# Patient Record
Sex: MALE | Race: BLACK OR AFRICAN AMERICAN | NOT HISPANIC OR LATINO | Employment: FULL TIME | URBAN - METROPOLITAN AREA
[De-identification: names, ages, dates, MRNs, and addresses within clinical notes are randomized per-mention and may not be internally consistent; named-entity substitution may affect disease eponyms.]

---

## 2017-12-11 ENCOUNTER — HOSPITAL ENCOUNTER (INPATIENT)
Facility: HOSPITAL | Age: 37
LOS: 5 days | Discharge: HOME | DRG: 977 | End: 2017-12-16
Attending: EMERGENCY MEDICINE | Admitting: INTERNAL MEDICINE
Payer: COMMERCIAL

## 2017-12-11 DIAGNOSIS — B20 HIV (HUMAN IMMUNODEFICIENCY VIRUS INFECTION): ICD-10-CM

## 2017-12-11 DIAGNOSIS — B20 AIDS (ACQUIRED IMMUNODEFICIENCY SYNDROME), CD4 <=200: ICD-10-CM

## 2017-12-11 DIAGNOSIS — R55 SYNCOPE, UNSPECIFIED SYNCOPE TYPE: Primary | ICD-10-CM

## 2017-12-11 DIAGNOSIS — A52.3 NEUROSYPHILIS: ICD-10-CM

## 2017-12-11 DIAGNOSIS — L98.9 SKIN LESIONS: ICD-10-CM

## 2017-12-11 DIAGNOSIS — R55 SYNCOPE: ICD-10-CM

## 2017-12-11 LAB
ALBUMIN SERPL BCP-MCNC: 2.6 G/DL
ALP SERPL-CCNC: 81 U/L
ALT SERPL W/O P-5'-P-CCNC: 16 U/L
ANION GAP SERPL CALC-SCNC: 12 MMOL/L
AST SERPL-CCNC: 36 U/L
BACTERIA #/AREA URNS AUTO: ABNORMAL /HPF
BASOPHILS # BLD AUTO: 0.01 K/UL
BASOPHILS NFR BLD: 0.2 %
BILIRUB SERPL-MCNC: 0.4 MG/DL
BILIRUB UR QL STRIP: NEGATIVE
BUN SERPL-MCNC: 12 MG/DL
C TRACH DNA SPEC QL NAA+PROBE: NOT DETECTED
CALCIUM SERPL-MCNC: 9.6 MG/DL
CAOX CRY UR QL COMP ASSIST: ABNORMAL
CD3+CD4+ CELLS # BLD: 164 CELLS/UL (ref 300–1400)
CD3+CD4+ CELLS NFR BLD: 10.6 % (ref 28–57)
CHLORIDE SERPL-SCNC: 101 MMOL/L
CLARITY CSF: CLEAR
CLARITY UR REFRACT.AUTO: ABNORMAL
CO2 SERPL-SCNC: 23 MMOL/L
COLOR CSF: COLORLESS
COLOR UR AUTO: ABNORMAL
CREAT SERPL-MCNC: 1.1 MG/DL
DIFFERENTIAL METHOD: ABNORMAL
EOSINOPHIL # BLD AUTO: 0 K/UL
EOSINOPHIL NFR BLD: 0 %
ERYTHROCYTE [DISTWIDTH] IN BLOOD BY AUTOMATED COUNT: 14.5 %
EST. GFR  (AFRICAN AMERICAN): >60 ML/MIN/1.73 M^2
EST. GFR  (NON AFRICAN AMERICAN): >60 ML/MIN/1.73 M^2
GLUCOSE CSF-MCNC: 57 MG/DL
GLUCOSE SERPL-MCNC: 124 MG/DL
GLUCOSE UR QL STRIP: NEGATIVE
GRAN CASTS UR QL COMP ASSIST: 1 /LPF
HCT VFR BLD AUTO: 33.7 %
HGB BLD-MCNC: 10.4 G/DL
HGB UR QL STRIP: ABNORMAL
HYALINE CASTS UR QL AUTO: 7 /LPF
IMM GRANULOCYTES # BLD AUTO: 0.04 K/UL
IMM GRANULOCYTES NFR BLD AUTO: 0.9 %
KETONES UR QL STRIP: NEGATIVE
LDH SERPL L TO P-CCNC: 223 U/L
LEUKOCYTE ESTERASE UR QL STRIP: NEGATIVE
LYMPHOCYTES # BLD AUTO: 1.6 K/UL
LYMPHOCYTES NFR BLD: 37 %
LYMPHOCYTES NFR CSF MANUAL: 75 %
MCH RBC QN AUTO: 28.8 PG
MCHC RBC AUTO-ENTMCNC: 30.9 G/DL
MCV RBC AUTO: 93 FL
MICROSCOPIC COMMENT: ABNORMAL
MONOCYTES # BLD AUTO: 0.5 K/UL
MONOCYTES NFR BLD: 12.5 %
MONOS+MACROS NFR CSF MANUAL: 24 %
N GONORRHOEA DNA SPEC QL NAA+PROBE: NOT DETECTED
NEUTROPHILS # BLD AUTO: 2.1 K/UL
NEUTROPHILS NFR BLD: 49.4 %
NEUTROPHILS NFR CSF MANUAL: 1 %
NITRITE UR QL STRIP: NEGATIVE
NRBC BLD-RTO: 0 /100 WBC
PH UR STRIP: 5 [PH] (ref 5–8)
PLATELET # BLD AUTO: 441 K/UL
PMV BLD AUTO: 8.7 FL
POTASSIUM SERPL-SCNC: 4 MMOL/L
PROT CSF-MCNC: 35 MG/DL
PROT SERPL-MCNC: 10.6 G/DL
PROT UR QL STRIP: ABNORMAL
RBC # BLD AUTO: 3.61 M/UL
RBC # CSF: 0 /CU MM
RBC #/AREA URNS AUTO: 1 /HPF (ref 0–4)
SODIUM SERPL-SCNC: 136 MMOL/L
SP GR UR STRIP: >=1.03 (ref 1–1.03)
SPECIMEN VOL CSF: 1.8 ML
SQUAMOUS #/AREA URNS AUTO: 0 /HPF
URN SPEC COLLECT METH UR: ABNORMAL
UROBILINOGEN UR STRIP-ACNC: NEGATIVE EU/DL
WBC # BLD AUTO: 4.24 K/UL
WBC # CSF: 3 /CU MM
WBC #/AREA URNS AUTO: 5 /HPF (ref 0–5)

## 2017-12-11 PROCEDURE — 86403 PARTICLE AGGLUT ANTBDY SCRN: CPT | Mod: 91

## 2017-12-11 PROCEDURE — 86592 SYPHILIS TEST NON-TREP QUAL: CPT

## 2017-12-11 PROCEDURE — 80053 COMPREHEN METABOLIC PANEL: CPT

## 2017-12-11 PROCEDURE — 36415 COLL VENOUS BLD VENIPUNCTURE: CPT

## 2017-12-11 PROCEDURE — 85025 COMPLETE CBC W/AUTO DIFF WBC: CPT

## 2017-12-11 PROCEDURE — 89051 BODY FLUID CELL COUNT: CPT

## 2017-12-11 PROCEDURE — 99285 EMERGENCY DEPT VISIT HI MDM: CPT | Mod: 25,,, | Performed by: EMERGENCY MEDICINE

## 2017-12-11 PROCEDURE — 84157 ASSAY OF PROTEIN OTHER: CPT

## 2017-12-11 PROCEDURE — 93005 ELECTROCARDIOGRAM TRACING: CPT

## 2017-12-11 PROCEDURE — 009U3ZX DRAINAGE OF SPINAL CANAL, PERCUTANEOUS APPROACH, DIAGNOSTIC: ICD-10-PCS | Performed by: EMERGENCY MEDICINE

## 2017-12-11 PROCEDURE — 86592 SYPHILIS TEST NON-TREP QUAL: CPT | Mod: 91

## 2017-12-11 PROCEDURE — 87205 SMEAR GRAM STAIN: CPT

## 2017-12-11 PROCEDURE — 86593 SYPHILIS TEST NON-TREP QUANT: CPT

## 2017-12-11 PROCEDURE — 99285 EMERGENCY DEPT VISIT HI MDM: CPT | Mod: 25

## 2017-12-11 PROCEDURE — 87529 HSV DNA AMP PROBE: CPT | Mod: 59

## 2017-12-11 PROCEDURE — 62270 DX LMBR SPI PNXR: CPT

## 2017-12-11 PROCEDURE — 62270 DX LMBR SPI PNXR: CPT | Mod: ,,, | Performed by: EMERGENCY MEDICINE

## 2017-12-11 PROCEDURE — 87591 N.GONORRHOEAE DNA AMP PROB: CPT

## 2017-12-11 PROCEDURE — 83615 LACTATE (LD) (LDH) ENZYME: CPT

## 2017-12-11 PROCEDURE — 86361 T CELL ABSOLUTE COUNT: CPT

## 2017-12-11 PROCEDURE — 86780 TREPONEMA PALLIDUM: CPT

## 2017-12-11 PROCEDURE — 81001 URINALYSIS AUTO W/SCOPE: CPT

## 2017-12-11 PROCEDURE — 86593 SYPHILIS TEST NON-TREP QUANT: CPT | Mod: 91

## 2017-12-11 PROCEDURE — 99000 SPECIMEN HANDLING OFFICE-LAB: CPT

## 2017-12-11 PROCEDURE — 11000001 HC ACUTE MED/SURG PRIVATE ROOM

## 2017-12-11 PROCEDURE — 25000003 PHARM REV CODE 250: Performed by: HOSPITALIST

## 2017-12-11 PROCEDURE — 93010 ELECTROCARDIOGRAM REPORT: CPT | Mod: ,,, | Performed by: INTERNAL MEDICINE

## 2017-12-11 PROCEDURE — 87070 CULTURE OTHR SPECIMN AEROBIC: CPT

## 2017-12-11 PROCEDURE — 87798 DETECT AGENT NOS DNA AMP: CPT

## 2017-12-11 PROCEDURE — 96360 HYDRATION IV INFUSION INIT: CPT

## 2017-12-11 PROCEDURE — 87529 HSV DNA AMP PROBE: CPT

## 2017-12-11 PROCEDURE — 87252 VIRUS INOCULATION TISSUE: CPT

## 2017-12-11 PROCEDURE — 96361 HYDRATE IV INFUSION ADD-ON: CPT

## 2017-12-11 PROCEDURE — 25000003 PHARM REV CODE 250: Performed by: PHYSICIAN ASSISTANT

## 2017-12-11 PROCEDURE — 82945 GLUCOSE OTHER FLUID: CPT

## 2017-12-11 PROCEDURE — 87102 FUNGUS ISOLATION CULTURE: CPT

## 2017-12-11 PROCEDURE — 86403 PARTICLE AGGLUT ANTBDY SCRN: CPT

## 2017-12-11 RX ORDER — LEVOFLOXACIN 500 MG/1
500 TABLET, FILM COATED ORAL
Status: ON HOLD | COMMUNITY
End: 2017-12-15 | Stop reason: HOSPADM

## 2017-12-11 RX ORDER — LIDOCAINE HYDROCHLORIDE 10 MG/ML
100 INJECTION, SOLUTION EPIDURAL; INFILTRATION; INTRACAUDAL; PERINEURAL
Status: COMPLETED | OUTPATIENT
Start: 2017-12-11 | End: 2017-12-11

## 2017-12-11 RX ORDER — PROMETHAZINE HYDROCHLORIDE 25 MG/1
25 TABLET ORAL EVERY 4 HOURS PRN
Status: DISCONTINUED | OUTPATIENT
Start: 2017-12-11 | End: 2017-12-16 | Stop reason: HOSPADM

## 2017-12-11 RX ORDER — ACETAMINOPHEN 325 MG/1
650 TABLET ORAL
Status: COMPLETED | OUTPATIENT
Start: 2017-12-11 | End: 2017-12-11

## 2017-12-11 RX ORDER — HYDROCODONE BITARTRATE AND ACETAMINOPHEN 5; 325 MG/1; MG/1
1 TABLET ORAL EVERY 4 HOURS PRN
Status: DISCONTINUED | OUTPATIENT
Start: 2017-12-11 | End: 2017-12-16 | Stop reason: HOSPADM

## 2017-12-11 RX ORDER — MOXIFLOXACIN HYDROCHLORIDE 400 MG/1
400 TABLET ORAL DAILY
Status: DISCONTINUED | OUTPATIENT
Start: 2017-12-12 | End: 2017-12-12

## 2017-12-11 RX ADMIN — ACETAMINOPHEN 650 MG: 325 TABLET ORAL at 01:12

## 2017-12-11 RX ADMIN — LIDOCAINE HYDROCHLORIDE 100 MG: 10 INJECTION, SOLUTION EPIDURAL; INFILTRATION; INTRACAUDAL; PERINEURAL at 01:12

## 2017-12-11 RX ADMIN — SODIUM CHLORIDE 1000 ML: 0.9 INJECTION, SOLUTION INTRAVENOUS at 10:12

## 2017-12-11 NOTE — ED NOTES
APPEARANCE: awake and alert in NAD.  SKIN: warm, dry and intact. 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   MUSCULOSKELETAL: Patient moving all extremities spontaneously, no obvious swelling or deformities noted. Ambulates independently.  RESPIRATORY: no shortness of breath.  CARDIAC: heart tones normal. Regular rate and rhythm; 2+ distal pulses; no peripheral edema  ABDOMEN: S/ND/NT, normoactive bowel sounds present in all four quadrants. Normal stool pattern.  : voids spontaneously without difficulty.  Neurologic: AAO x 4; follows commands equal strength in all extremities; denies numbness/tingling.

## 2017-12-11 NOTE — ED PROVIDER NOTES
Encounter Date: 12/11/2017    SCRIBE #1 NOTE: I, Charisma Whitaker, am scribing for, and in the presence of,  Dr. Camacho. I have scribed the following portions of the note - the APC attestation and the EKG reading. Other sections scribed: CT Head.       History     Chief Complaint   Patient presents with    Loss of Consciousness     syncopal episode while at work, reported feeling dizzy and clammy, then woke up with people around him.  was helped to ground per witnesses     Patient is a 37-year-old male with past medical history of HIV, syphilis who presents the ED with LOC.  Patient states around 9:40 AM today, he was standing with coworkers.  Patient states prior to losing consciousness, he became dizzy and clammy for a couple of seconds.  He denies any other prodromal symptoms.  He is unsure how long he was out for, but states that it wishes for a few seconds.  He was helped to the ground.  Patient states that he swallowed himself during this incident.  He had some confusion after LOC, but is back at baseline. He was not told that he had any seizure-like activity. He did not eat or drink anything this morning like he normally does. He states that he noticed a lesion to his chin approximately 2 weeks ago, and other lesions to his neck, arms, and trunk one week ago.  Patient applied a Ace wrap to his left knee for pain relief, but denies any recent injury or trauma or dec ROM. He denies any headache, dizziness, sore throat, chest pain, cough, SOB, abdominal pain, diarrhea, constipation, penile pain, penile discharge, testicular swelling, dysuria, urinary frequency.  Patient does not take HIV medication (last use in August 2017) and does not follow-up with infectious disease any more due to costly medication. He does not know his last CD4 count.    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 His HIV status is not disclosed with her coworkers.               Review of patient's allergies indicates:  No Known  Allergies  Past Medical History:   Diagnosis Date    Anemia of other chronic disease 10/26/2016    HIV (human immunodeficiency virus infection)     Syphilis      Past Surgical History:   Procedure Laterality Date    ANKLE FRACTURE SURGERY      WISDOM TOOTH EXTRACTION       Family History   Problem Relation Age of Onset    No Known Problems Mother     No Known Problems Father      Social History   Substance Use Topics    Smoking status: Former Smoker     Types: Cigarettes    Smokeless tobacco: Not on file    Alcohol use 0.0 oz/week     Review of Systems   Constitutional: Negative for chills and fever.   HENT: Negative for ear pain, nosebleeds and sore throat.    Eyes: Negative for photophobia and visual disturbance.   Respiratory: Negative for shortness of breath.    Cardiovascular: Negative for chest pain.   Gastrointestinal: Negative for nausea.   Genitourinary: Negative for discharge, dysuria, hematuria, penile pain, penile swelling, scrotal swelling and testicular pain.   Musculoskeletal: Negative for back pain.   Skin: Positive for rash.   Neurological: Positive for dizziness and syncope. Negative for weakness and headaches.   Hematological: Does not bruise/bleed easily.       Physical Exam     Initial Vitals   BP Pulse Resp Temp SpO2   12/11/17 0930 12/11/17 0930 12/11/17 0930 12/11/17 1031 12/11/17 0930   126/70 100 15 97.7 °F (36.5 °C) 100 %      MAP       12/11/17 0930       88.67         Physical Exam    Vitals reviewed.  Constitutional: He appears well-developed and well-nourished. He is not diaphoretic.   HENT:   Head: Normocephalic and atraumatic.   Nose: Nose normal.   Mouth/Throat: Oropharynx is clear and moist. No oral lesions. No trismus in the jaw. No uvula swelling or lacerations. No oropharyngeal exudate, posterior oropharyngeal edema, posterior oropharyngeal erythema or tonsillar abscesses.   Eyes: Conjunctivae and EOM are normal.   Neck: Normal range of motion.   Cardiovascular: Normal  rate, regular rhythm and normal heart sounds. Exam reveals no friction rub.    No murmur heard.  Pulmonary/Chest: Breath sounds normal. No respiratory distress. He has no wheezes. He has no rales.   Abdominal: Soft. Bowel sounds are normal. He exhibits no distension. There is no tenderness.   Musculoskeletal: Normal range of motion.        Left knee: He exhibits normal range of motion, no swelling, no effusion, no ecchymosis, no deformity, no erythema and no bony tenderness. No tenderness found.   Neurological: He is alert and oriented to person, place, and time. He has normal strength. No sensory deficit.   Good finger .  Normal finger to nose and rapid alternating hand movements.   Skin: Skin is warm and dry. Lesion noted. No erythema.   Raised, ulcerated lesion to chin and anterior neck.  Multiple smaller lesions noted to bilateral arms and trunk.  Skin without surrounding erythema and warm.    Psychiatric: He has a normal mood and affect. Thought content normal.         ED Course   Lumbar Puncture  Date/Time: 12/11/2017 1:50 PM  Performed by: TYRON SANCHEZ  Authorized by: RUTH ANN KAMINSKI   Consent Done: Yes  Indications: evaluation for infection and evaluation for subarachnoid hemorrhage  Anesthesia: local infiltration    Anesthesia:  Local Anesthetic: lidocaine 1% without epinephrine  Anesthetic total: 6 mL  Patient sedated: no  Preparation: Patient was prepped and draped in the usual sterile fashion.  Lumbar space: L3-L4 interspace  Patient's position: left lateral decubitus  Needle gauge: 20  Needle type: spinal needle - Quincke tip  Needle length: 3.5 in  Number of attempts: 1  Opening pressure: 15 cm H2O  Fluid appearance: clear  Tubes of fluid: 4  Total volume: 6 ml  Post-procedure: site cleaned and adhesive bandage applied  Complications: No  Estimated blood loss (mL): 0  Patient tolerance: Patient tolerated the procedure well with no immediate complications        Labs Reviewed   CBC W/ AUTO  DIFFERENTIAL - Abnormal; Notable for the following:        Result Value    RBC 3.61 (*)     Hemoglobin 10.4 (*)     Hematocrit 33.7 (*)     MCHC 30.9 (*)     Platelets 441 (*)     MPV 8.7 (*)     Immature Granulocytes 0.9 (*)     All other components within normal limits   COMPREHENSIVE METABOLIC PANEL - Abnormal; Notable for the following:     Glucose 124 (*)     Total Protein 10.6 (*)     Albumin 2.6 (*)     All other components within normal limits   URINALYSIS, REFLEX TO URINE CULTURE - Abnormal; Notable for the following:     Appearance, UA Hazy (*)     Specific Gravity, UA >=1.030 (*)     Protein, UA 2+ (*)     Occult Blood UA 1+ (*)     All other components within normal limits    Narrative:     Preferred Collection Type->Urine, Clean Catch   T-HELPER CELLS (CD4) COUNT - Abnormal; Notable for the following:     CD4 % [place] T Cell 10.6 (*)     Absolute CD4 164 (*)     All other components within normal limits    Narrative:     add on CD4 T helper celles Order #[id number] RPR Order #[id number]  per   Dr Li @  12/11/2017  10:57    URINALYSIS MICROSCOPIC - Abnormal; Notable for the following:     Bacteria, UA Few (*)     Hyaline Casts, UA 7 (*)     Granular Casts, UA 1 (*)     All other components within normal limits    Narrative:     Preferred Collection Type->Urine, Clean Catch   C. TRACHOMATIS/N. GONORRHOEAE BY AMP DNA   C. TRACHOMATIS/N. GONORRHOEAE BY AMP DNA    Narrative:     received yellow and gray top   CULTURE, CSF  (INCLUDES STAIN)    Narrative:     On which sequentially labeled tube should this analysis be  performed?->2   CRYPTOCOCCAL ANTIGEN, CSF   CULTURE, FUNGUS   T-HELPER CELLS (CD4) COUNT   RPR   GLUCOSE, CSF   PROTEIN, CSF   HERPES SIMPLEX (HSV) BY RAPID PCR, NON-BLOOD    Narrative:     Receiving Lab?->[facility]   TOXOPLASMA GONDII/PCR, NON-BLOOD   CULTURE, VIRAL    Narrative:     Specimen Tube Number->Tube 3  Receiving Lab?->[facility]   CSF CELL COUNT WITH DIFFERENTIAL   RPR   FREEZE AND HOLD -     VDRL, CSF   POCT GLUCOSE MONITORING CONTINUOUS     EKG Readings: (Independently Interpreted)   NSR at 93 bpm with no ischemic changes       X-Rays:   Independently Interpreted Readings:   Head CT: Negative for acute process.      Medical Decision Making:   History:   Old Medical Records: I decided to obtain old medical records.  Clinical Tests:   Lab Tests: Ordered and Reviewed  Radiological Study: Ordered and Reviewed  Medical Tests: Ordered and Reviewed         APC / Resident Notes:   On chart review, Patient's last CD4 count on 10/25/16 was 265. Patient with latent syphilis in 2016, but he denies knowing history. Had LP in 11/2016 with negative VDRL csf.     On exam, tachycardia at 103 bpm. Cutaneous raised, ulcerated lesions noted to chin and neck. There are smaller lesions throughout of trunk and extremities. Kaposi's sarcoma vs. Gumma vs. scleroderma.  Oropharynx clear. Heart with tachycardia otherwise heart and lung exam normal. Abd soft, NTND. Left knee benign.    DDX includes but is not limited to vasovagal, hypoglycemia, electrolyte abnormalities, dehydration, orthostasis, anemia, UTI, tertiary or latent syphilis, neurosyphilis, brain lesion.  Will order labs and CT head, give IV fluids, and continue to monitor.    ECG with NSR at 93 bpm.  UA with no signs of infection.  CBC with H/H of 10.4/33.7. CMP with no significant electrolyte abn. Glucose of 124. Cr stable at 1.1.   No transaminitis or hyperbilirubinemia.   CT head unremarkable.    CD4 of 164.   RPR pending.  CSF with 3 wbc and 0 rbcs. Serologies pending.    4:10 PM  I called lab to inquire about CSF results. They said VDRL wont be back until Wednesday, 2 days from now. Toxoplasmosis and HSV CSF were sent out to outside facilities and will not result today. Given patient's immunocompromised state, syncopal episode, and public health risk, patient will be admitted to hospital medicine for further evaluation and management. Patient is agreeable  to plan.        Scribe Attestation:   Scribe #1: I performed the above scribed service and the documentation accurately describes the services I performed. I attest to the accuracy of the note.    Attending Attestation:     Physician Attestation Statement for NP/PA:   I have conducted a face to face encounter with this patient in addition to the NP/PA, due to Medical Complexity    Other NP/PA Attestation Additions:    History of Present Illness: 37 year old male with a hx of HIV not on heart therapy since August presents with syncopal episode with bowel incontinence. The event was witnessed by medical professionals who denied tonic clonic movement or tongue biting. Pt's vital signs are stable.     DDx: vasovagal syncope, new onset seizure, orthostatic hypotension, intracranial lesion, infectious process    Labs are unremarkable. LP performed to rule out infectious processes as he is immunocompromised.     CSF negative.  Pt will be placed in obs for further evaluation           Physician Attestation for Scribe:      Comments: I, Dr. Janine Camacho, personally performed the services described in this documentation. All medical record entries made by the scribe were at my direction and in my presence.  I have reviewed the chart and agree that the record reflects my personal performance and is accurate and complete. Janine Camacho MD.  6:15 AM 12/12/2017              ED Course      Clinical Impression:   The primary encounter diagnosis was Syncope, unspecified syncope type. A diagnosis of HIV (human immunodeficiency virus infection) was also pertinent to this visit.    Disposition:   Disposition: Admitted                        Janine Camacho MD  12/12/17 0617       Latanya Li PA-C  12/12/17 0718

## 2017-12-11 NOTE — HOSPITAL COURSE
Patient is a 37-year-old male with past medical history of HIV, syphilis who presents the ED with LOC.  Patient states around 9:40 AM today, he was standing with coworkers.  Patient states prior to losing consciousness, he became dizzy and clammy for a couple of seconds.  He denies any other prodromal symptoms.  He is unsure how long he was out for, but states that it wishes for a few seconds.  He was helped to the ground.  Patient states that he swallowed himself during this incident.  He had some confusion after LOC, but is back at baseline. He was not told that he had any seizure-like activity. He did not eat or drink anything this morning like he normally does. He states that he noticed a lesion to his chin approximately 2 weeks ago, and other lesions to his neck, arms, and trunk one week ago.  Patient applied a Ace wrap to his left knee for pain relief, but denies any recent injury or trauma or dec ROM. He denies any headache, dizziness, sore throat, chest pain, cough, SOB, abdominal pain, diarrhea, constipation, penile pain, penile discharge, testicular swelling, dysuria, urinary frequency.  Patient does not take HIV medication (last use in August 2017) and does not follow-up with infectious disease any more due to costly medication. He does not know his last CD4 count.     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

## 2017-12-11 NOTE — ED TRIAGE NOTES
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 Pt reports he had a +LOC and was caught falling by his coworkers. PT reports it lasted a few seconds. PT denies dizziness, chest pain or sob at this time. Pt reports her urinated on himself when he passed out.

## 2017-12-11 NOTE — H&P
[facility] Hospital Medicine  History & Physical    Patient Name: Herson Chavez  MRN: [id number]  Admission Date: 12/11/2017  Attending Physician: Janine Camacho MD   Primary Care Provider: Becky Sanders MD    [facility] Medicine Team: [facility] Leo Estevez MD     Patient information was obtained from patient and ER records.     Subjective:     Principal Problem:<principal problem not specified>    Chief Complaint:   Chief Complaint   Patient presents with    Loss of Consciousness     syncopal episode while at work, reported feeling dizzy and clammy, then woke up with people around him.  was helped to ground per witnesses        HPI: No notes on file    Past Medical History:   Diagnosis Date    Anemia of other chronic disease 10/26/2016    HIV (human immunodeficiency virus infection)     Syphilis        Past Surgical History:   Procedure Laterality Date    ANKLE FRACTURE SURGERY      WISDOM TOOTH EXTRACTION         Review of patient's allergies indicates:  No Known Allergies    No current facility-administered medications on file prior to encounter.      Current Outpatient Prescriptions on File Prior to Encounter   Medication Sig    hydrocodone-acetaminophen 5-325mg (NORCO) 5-325 mg per tablet Take 1 tablet by mouth every 4 (four) hours as needed.    promethazine (PHENERGAN) 25 MG tablet Take 1 tablet (25 mg total) by mouth every 4 (four) hours as needed for Nausea.     Family History     Problem Relation (Age of Onset)    No Known Problems Mother, Father        Social History Main Topics    Smoking status: Former Smoker     Types: Cigarettes    Smokeless tobacco: Not on file    Alcohol use 0.0 oz/week    Drug use: No    Sexual activity: No     Review of Systems   Constitutional: Positive for activity change and appetite change.   Eyes: Negative.    Respiratory: Negative.    Cardiovascular: Negative.    Gastrointestinal: Negative.    Endocrine: Negative.     Genitourinary: Negative.    Musculoskeletal: Negative.    Skin: Negative.    Allergic/Immunologic: Negative.    Neurological: Negative.    Hematological: Negative.    Psychiatric/Behavioral: Negative.      Objective:     Vital Signs (Most Recent):  Temp: 97.7 °F (36.5 °C) (12/11/17 1031)  Pulse: 99 (12/11/17 1600)  Resp: 19 (12/11/17 1600)  BP: 116/66 (12/11/17 1601)  SpO2: 98 % (12/11/17 1601) Vital Signs (24h Range):  Temp:  [97.7 °F (36.5 °C)] 97.7 °F (36.5 °C)  Pulse:  [] 99  Resp:  [14-20] 19  SpO2:  [98 %-100 %] 98 %  BP: (116-151)/() 116/66     Weight: 77.1 kg (170 lb)  Body mass index is 24.39 kg/m².    Physical Exam   Constitutional: He is oriented to person, place, and time. He appears well-developed and well-nourished.   HENT:   Head: Normocephalic and atraumatic.   Eyes: EOM are normal. Pupils are equal, round, and reactive to light.   Neck: Normal range of motion. Neck supple. Erythema present.       Neurological: He is alert and oriented to person, place, and time.   Skin: Skin is warm. There is erythema.         CRANIAL NERVES     CN III, IV, VI   Pupils are equal, round, and reactive to light.  Extraocular motions are normal.        Significant Labs:   CBC:   Recent Labs  Lab 12/11/17  0944   WBC 4.24   HGB 10.4*   HCT 33.7*   *           Assessment/Plan:     Syncope    CSF with no evidence of acute meningitis  Serologies pending          HIV (human immunodeficiency virus infection)    CD4 is 164  HIV status unknown to workers  Does not have infectious disease follow up care  Kaposi's is certainly possible            VTE Risk Mitigation         Ordered     Medium Risk of VTE  Once      12/11/17 7561             Leo Estevez MD  Department of Hospital Medicine   [facility]

## 2017-12-11 NOTE — SUBJECTIVE & OBJECTIVE
Past Medical History:   Diagnosis Date    Anemia of other chronic disease 10/26/2016    HIV (human immunodeficiency virus infection)     Syphilis        Past Surgical History:   Procedure Laterality Date    ANKLE FRACTURE SURGERY      WISDOM TOOTH EXTRACTION         Review of patient's allergies indicates:  No Known Allergies    No current facility-administered medications on file prior to encounter.      Current Outpatient Prescriptions on File Prior to Encounter   Medication Sig    hydrocodone-acetaminophen 5-325mg (NORCO) 5-325 mg per tablet Take 1 tablet by mouth every 4 (four) hours as needed.    promethazine (PHENERGAN) 25 MG tablet Take 1 tablet (25 mg total) by mouth every 4 (four) hours as needed for Nausea.     Family History     Problem Relation (Age of Onset)    No Known Problems Mother, Father        Social History Main Topics    Smoking status: Former Smoker     Types: Cigarettes    Smokeless tobacco: Not on file    Alcohol use 0.0 oz/week    Drug use: No    Sexual activity: No     Review of Systems   Constitutional: Positive for activity change and appetite change.   Eyes: Negative.    Respiratory: Negative.    Cardiovascular: Negative.    Gastrointestinal: Negative.    Endocrine: Negative.    Genitourinary: Negative.    Musculoskeletal: Negative.    Skin: Negative.    Allergic/Immunologic: Negative.    Neurological: Negative.    Hematological: Negative.    Psychiatric/Behavioral: Negative.      Objective:     Vital Signs (Most Recent):  Temp: 97.7 °F (36.5 °C) (12/11/17 1031)  Pulse: 99 (12/11/17 1600)  Resp: 19 (12/11/17 1600)  BP: 116/66 (12/11/17 1601)  SpO2: 98 % (12/11/17 1601) Vital Signs (24h Range):  Temp:  [97.7 °F (36.5 °C)] 97.7 °F (36.5 °C)  Pulse:  [] 99  Resp:  [14-20] 19  SpO2:  [98 %-100 %] 98 %  BP: (116-151)/() 116/66     Weight: 77.1 kg (170 lb)  Body mass index is 24.39 kg/m².    Physical Exam   Constitutional: He is oriented to person, place, and time. He  appears well-developed and well-nourished.   HENT:   Head: Normocephalic and atraumatic.   Eyes: EOM are normal. Pupils are equal, round, and reactive to light.   Neck: Normal range of motion. Neck supple. Erythema present.       Neurological: He is alert and oriented to person, place, and time.   Skin: Skin is warm. There is erythema.         CRANIAL NERVES     CN III, IV, VI   Pupils are equal, round, and reactive to light.  Extraocular motions are normal.        Significant Labs:   CBC:   Recent Labs  Lab 12/11/17  0944   WBC 4.24   HGB 10.4*   HCT 33.7*   *

## 2017-12-11 NOTE — ASSESSMENT & PLAN NOTE
CD4 is 164  HIV status unknown to workers  Does not have infectious disease follow up care  Kaposi's is certainly possible

## 2017-12-12 PROBLEM — L98.9 SKIN LESIONS: Status: ACTIVE | Noted: 2017-12-12

## 2017-12-12 LAB
ALBUMIN SERPL BCP-MCNC: 2.2 G/DL
ALP SERPL-CCNC: 78 U/L
ALT SERPL W/O P-5'-P-CCNC: 16 U/L
ANION GAP SERPL CALC-SCNC: 7 MMOL/L
AST SERPL-CCNC: 33 U/L
BASOPHILS # BLD AUTO: 0 K/UL
BASOPHILS NFR BLD: 0 %
BILIRUB SERPL-MCNC: 0.4 MG/DL
BUN SERPL-MCNC: 8 MG/DL
CALCIUM SERPL-MCNC: 9 MG/DL
CHLORIDE SERPL-SCNC: 100 MMOL/L
CO2 SERPL-SCNC: 27 MMOL/L
CREAT SERPL-MCNC: 0.9 MG/DL
CRYPTOC AG CSF QL LA: NEGATIVE
CRYPTOC AG CSF QL LA: NEGATIVE
DIFFERENTIAL METHOD: ABNORMAL
EOSINOPHIL # BLD AUTO: 0 K/UL
EOSINOPHIL NFR BLD: 0 %
ERYTHROCYTE [DISTWIDTH] IN BLOOD BY AUTOMATED COUNT: 14.6 %
EST. GFR  (AFRICAN AMERICAN): >60 ML/MIN/1.73 M^2
EST. GFR  (NON AFRICAN AMERICAN): >60 ML/MIN/1.73 M^2
GLUCOSE SERPL-MCNC: 119 MG/DL
HCT VFR BLD AUTO: 27.3 %
HGB BLD-MCNC: 8.6 G/DL
HSV1 DNA SPEC QL NAA+PROBE: NORMAL
HSV1, PCR, CSF: NEGATIVE
HSV2 DNA SPEC QL NAA+PROBE: NORMAL
HSV2, PCR, CSF: NEGATIVE
IMM GRANULOCYTES # BLD AUTO: 0.03 K/UL
IMM GRANULOCYTES NFR BLD AUTO: 0.7 %
LACTATE SERPL-SCNC: 0.9 MMOL/L
LYMPHOCYTES # BLD AUTO: 0.9 K/UL
LYMPHOCYTES NFR BLD: 21 %
MCH RBC QN AUTO: 29.2 PG
MCHC RBC AUTO-ENTMCNC: 31.5 G/DL
MCV RBC AUTO: 93 FL
MONOCYTES # BLD AUTO: 0.4 K/UL
MONOCYTES NFR BLD: 10.4 %
NEUTROPHILS # BLD AUTO: 2.8 K/UL
NEUTROPHILS NFR BLD: 67.9 %
NRBC BLD-RTO: 0 /100 WBC
PLATELET # BLD AUTO: 420 K/UL
PMV BLD AUTO: 8.8 FL
POTASSIUM SERPL-SCNC: 3.9 MMOL/L
PROT SERPL-MCNC: 9.1 G/DL
RBC # BLD AUTO: 2.95 M/UL
RPR SER QL: REACTIVE
RPR SER-TITR: ABNORMAL {TITER}
SODIUM SERPL-SCNC: 134 MMOL/L
SPECIMEN SOURCE: NORMAL
VDRL CSF QL: REACTIVE
VDRL CSF-TITR: ABNORMAL {TITER}
WBC # BLD AUTO: 4.05 K/UL

## 2017-12-12 PROCEDURE — 87449 NOS EACH ORGANISM AG IA: CPT

## 2017-12-12 PROCEDURE — 88305 TISSUE EXAM BY PATHOLOGIST: CPT | Performed by: PATHOLOGY

## 2017-12-12 PROCEDURE — 25000003 PHARM REV CODE 250: Performed by: HOSPITALIST

## 2017-12-12 PROCEDURE — 25000003 PHARM REV CODE 250: Performed by: PHYSICIAN ASSISTANT

## 2017-12-12 PROCEDURE — 88312 SPECIAL STAINS GROUP 1: CPT | Mod: 26,,, | Performed by: PATHOLOGY

## 2017-12-12 PROCEDURE — 86403 PARTICLE AGGLUT ANTBDY SCRN: CPT

## 2017-12-12 PROCEDURE — 11000001 HC ACUTE MED/SURG PRIVATE ROOM

## 2017-12-12 PROCEDURE — 85025 COMPLETE CBC W/AUTO DIFF WBC: CPT

## 2017-12-12 PROCEDURE — 36415 COLL VENOUS BLD VENIPUNCTURE: CPT

## 2017-12-12 PROCEDURE — 86612 BLASTOMYCES ANTIBODY: CPT

## 2017-12-12 PROCEDURE — 63600175 PHARM REV CODE 636 W HCPCS: Performed by: INTERNAL MEDICINE

## 2017-12-12 PROCEDURE — 87186 SC STD MICRODIL/AGAR DIL: CPT

## 2017-12-12 PROCEDURE — 80053 COMPREHEN METABOLIC PANEL: CPT

## 2017-12-12 PROCEDURE — 87116 MYCOBACTERIA CULTURE: CPT

## 2017-12-12 PROCEDURE — 25000003 PHARM REV CODE 250: Performed by: INTERNAL MEDICINE

## 2017-12-12 PROCEDURE — 87101 SKIN FUNGI CULTURE: CPT

## 2017-12-12 PROCEDURE — 87040 BLOOD CULTURE FOR BACTERIA: CPT

## 2017-12-12 PROCEDURE — 86698 HISTOPLASMA ANTIBODY: CPT

## 2017-12-12 PROCEDURE — 83605 ASSAY OF LACTIC ACID: CPT

## 2017-12-12 PROCEDURE — 99222 1ST HOSP IP/OBS MODERATE 55: CPT | Mod: ,,, | Performed by: INTERNAL MEDICINE

## 2017-12-12 PROCEDURE — 0HBEXZX EXCISION OF LEFT LOWER ARM SKIN, EXTERNAL APPROACH, DIAGNOSTIC: ICD-10-PCS | Performed by: DERMATOLOGY

## 2017-12-12 PROCEDURE — 87077 CULTURE AEROBIC IDENTIFY: CPT

## 2017-12-12 PROCEDURE — 87070 CULTURE OTHR SPECIMN AEROBIC: CPT

## 2017-12-12 PROCEDURE — 88342 IMHCHEM/IMCYTCHM 1ST ANTB: CPT | Mod: 26,,, | Performed by: PATHOLOGY

## 2017-12-12 PROCEDURE — 87176 TISSUE HOMOGENIZATION CULTR: CPT

## 2017-12-12 PROCEDURE — 87385 HISTOPLASMA CAPSUL AG IA: CPT | Mod: 91

## 2017-12-12 PROCEDURE — 88341 IMHCHEM/IMCYTCHM EA ADD ANTB: CPT | Mod: 26,,, | Performed by: PATHOLOGY

## 2017-12-12 PROCEDURE — 99233 SBSQ HOSP IP/OBS HIGH 50: CPT | Mod: ,,, | Performed by: HOSPITALIST

## 2017-12-12 PROCEDURE — 87385 HISTOPLASMA CAPSUL AG IA: CPT

## 2017-12-12 PROCEDURE — 87075 CULTR BACTERIA EXCEPT BLOOD: CPT

## 2017-12-12 RX ORDER — SODIUM CHLORIDE 9 MG/ML
INJECTION, SOLUTION INTRAVENOUS CONTINUOUS
Status: DISCONTINUED | OUTPATIENT
Start: 2017-12-12 | End: 2017-12-16 | Stop reason: HOSPADM

## 2017-12-12 RX ORDER — ACETAMINOPHEN 325 MG/1
650 TABLET ORAL EVERY 6 HOURS PRN
Status: DISCONTINUED | OUTPATIENT
Start: 2017-12-12 | End: 2017-12-16 | Stop reason: HOSPADM

## 2017-12-12 RX ADMIN — DEXTROSE MONOHYDRATE 20 MILLION UNITS: 50 INJECTION, SOLUTION INTRAVENOUS at 09:12

## 2017-12-12 RX ADMIN — ACETAMINOPHEN 650 MG: 325 TABLET ORAL at 08:12

## 2017-12-12 RX ADMIN — ACETAMINOPHEN 650 MG: 325 TABLET ORAL at 12:12

## 2017-12-12 RX ADMIN — MOXIFLOXACIN HYDROCHLORIDE 400 MG: 400 TABLET, FILM COATED ORAL at 09:12

## 2017-12-12 RX ADMIN — SODIUM CHLORIDE 500 ML: 0.9 INJECTION, SOLUTION INTRAVENOUS at 03:12

## 2017-12-12 RX ADMIN — SODIUM CHLORIDE: 9 INJECTION, SOLUTION INTRAVENOUS at 09:12

## 2017-12-12 NOTE — HPI
"This is a 38 YO F with past medical history of AIDs (CD4 count 164) noncompliant on medication (last taken [facility] August 2017 2/2 to cost), Hepatitis C, and latent syphilis admitted for LOC with bowel incontinence and admitted for further workup. Negative head CT and LP was performed to rule out infectious processes as he is immunocompromised.  LP with non-infectious cytology and is pending serology for VDRL, toxoplasmosis and  HSV.  Of note patient had latent syphilis in 2016 with LP 11/2016 with negative VDRL in CSF.     Patient states about 3 weeks ago started getting lesions popping out all over body. Asymptomatic and denies any itching or tenderness associated with these except large lip lesion. Start out as small papules that progress to large scaly plaques. Largest one on left lower lip that he keeps band aide on to keep from picking at. Also with one in between anus and scrotum. Denies that they bleed or ulcerate. Denies that any have resolved and just seems to crop up with new small ones with larger ones progressing. Denies any recent travel or contact with cats.  +15 pound weight loss in past month with night sweats. States has a had dry cough since he was sick with "cold" about 2 weeks ago (skin lesions predate this). CXR negative for acute process. Fevers up to since admission 101.2 and blood cultures taken. ID has been consulted.           "

## 2017-12-12 NOTE — NURSING
"Telemetry monitor called and states pts heartrate is in the 150's.  Went to pt's room to check on him and he was returning to his bed from using the bathroom.  No signs of distress noted.  Pt said "I was just walking around the room". Will continue to monitor.   "

## 2017-12-12 NOTE — PROGRESS NOTES
Progress Note  Hospital Medicine    Admit Date: 12/11/2017  Length of Stay:  LOS: 1 day     SUBJECTIVE:         Follow-up For:  Syncope    HPI/Interval history (See H&P for complete P,F,SHx) :   12/12/17  s/p LP- non-infectious cytology with pending serology for VDRL, toxoplasmosis and  HSV.Fever of 103F last night. BC x2 obtained. s/p Dermatology and ID evaluation. s/p punch biopsy x2 for cultures and H&E    Review of Systems: List if applicable  Pain scale: 0 /10  Mild headache since lumbar punctuer  Mild left knee pain    OBJECTIVE:     Vital Signs Range (Last 24H):  Temp:  [98 °F (36.7 °C)-103.1 °F (39.5 °C)]   Pulse:  []   Resp:  [16-19]   BP: (112-140)/(65-85)   SpO2:  [93 %-100 %]     Physical Exam:  General- Patient alert and oriented x3   HEENT- PERRLA, EOMI, OP clear  Neck- No JVD, Lymphadenopathy, Thyromegaly  CV- Regular rate and rhythm, No Murmur/carlos/rubs  Resp- Lungs CTA Bilaterally, No increased WOB  Abdomen- Non tender/non-distended, BS normoactive x4 quads, no HSM  Extrem- No cyanosis, clubbing, edema.   Skin- No rashes, lesions, ulcers,  scattered distrubution of  lesions with sparing of palms and soles.   Neuro- Strength 5/5 flexors/extensors,Intact sensation to light touch grossly      Medications:  Medication list was reviewed and changes noted under Assessment/Plan.      Current Facility-Administered Medications:     acetaminophen tablet 650 mg, 650 mg, Oral, Q6H PRN, Shimon Elliott PA-C, 650 mg at 12/12/17 0044    hydrocodone-acetaminophen 5-325mg per tablet 1 tablet, 1 tablet, Oral, Q4H PRN, Leo Estevez MD    moxifloxacin tablet 400 mg, 400 mg, Oral, Daily, Leo Estevez MD, 400 mg at 12/12/17 0923    promethazine tablet 25 mg, 25 mg, Oral, Q4H PRN, Leo Estevez MD    acetaminophen, hydrocodone-acetaminophen 5-325mg, promethazine    Laboratory/Diagnostic Data:  Reviewed and noted in plan where applicable- Please see chart for full lab data.      Recent Labs  Lab  12/11/17  0944 12/12/17  0058   WBC 4.24 4.05   HGB 10.4* 8.6*   HCT 33.7* 27.3*   * 420*         Recent Labs  Lab 12/11/17  0944 12/12/17  0821    134*   K 4.0 3.9    100   CO2 23 27   BUN 12 8   CREATININE 1.1 0.9   * 119*   CALCIUM 9.6 9.0         Recent Labs  Lab 12/11/17  0944 12/12/17  0821   ALKPHOS 81 78   ALT 16 16   AST 36 33   ALBUMIN 2.6* 2.2*   PROT 10.6* 9.1*   BILITOT 0.4 0.4        Microbiology labs for the last week  Microbiology Results (last 7 days)     Procedure Component Value Units Date/Time    Cryptococcal antigen, CSF (Tube 3) [[id number]] Collected:  12/11/17 1346    Order Status:  Completed Specimen:  CSF (Spinal Fluid) from CSF Tap, Tube 2 Updated:  12/12/17 1232     Crypto Ag, CSF Negative    Narrative:       On which sequentially labeled tube should this analysis be  performed?->3    Cryptococcal antigen, CSF [[id number]] Collected:  12/11/17 1350    Order Status:  Completed Specimen:  CSF (Spinal Fluid) Updated:  12/12/17 1230     Crypto Ag, CSF Negative    Narrative:       Add on order [id number] Crypto. Per MD Terrance  12/12/2017  10:32     Cryptococcal antigen [[id number]] Collected:  12/12/17 0958    Order Status:  Sent Specimen:  Blood from Blood Updated:  12/12/17 1008    Aerobic culture [[id number]]     Order Status:  No result Specimen:  Biopsy from Arm, Left     AFB Culture & Smear [[id number]]     Order Status:  No result Specimen:  Biopsy from Arm, Left     Culture, Anaerobe [[id number]]     Order Status:  No result Specimen:  Biopsy from Arm, Left     Cryptococcal antigen, CSF [[id number]]     Order Status:  Completed Specimen:  CSF (Spinal Fluid)     AFB Culture & Smear [[id number]]     Order Status:  Canceled Specimen:  Skin from Arm, Left     Aerobic culture [[id number]]     Order Status:  Canceled Specimen:  Skin from Arm, Left     Culture, Anaerobe [[id number]]     Order Status:  Canceled Specimen:  Skin from Arm, Left     Fungal culture , skin, hair, or  nails [[id number]]     Order Status:  No result Specimen:  Skin, Hair, Nail from Skin     Culture, Respiratory with Gram Stain [[id number]]     Order Status:  No result Specimen:  Respiratory     Blood culture [[id number]] Collected:  12/12/17 0058    Order Status:  Completed Specimen:  Blood Updated:  12/12/17 0915     Blood Culture, Routine No Growth to date    Blood culture [[id number]] Collected:  12/12/17 0058    Order Status:  Completed Specimen:  Blood Updated:  12/12/17 0915     Blood Culture, Routine No Growth to date    CSF culture and Gram Stain (Tube 2) [[id number]] Collected:  12/11/17 1346    Order Status:  Completed Specimen:  CSF (Spinal Fluid) from CSF Tap, Tube 2 Updated:  12/12/17 0823     CSF CULTURE No Growth to date     Gram Stain Result No WBC's      No organisms seen    Narrative:       On which sequentially labeled tube should this analysis be  performed?->2    C. trachomatis/N. gonorrhoeae by AMP DNA Urine [[id number]] Collected:  12/11/17 1200    Order Status:  Completed Specimen:  Genital Updated:  12/11/17 1624     Chlamydia, Amplified DNA Not Detected     N gonorrhoeae, amplified DNA Not Detected    Narrative:       received yellow and gray top    Fungus culture (Tube 3) [[id number]] Collected:  12/11/17 1346    Order Status:  Sent Specimen:  CSF (Spinal Fluid) from CSF Tap, Tube 2 Updated:  12/11/17 1412    C. trachomatis/N. gonorrhoeae by AMP DNA [[id number]] Collected:  12/11/17 1026    Order Status:  Canceled Updated:  12/11/17 1105    C. trachomatis/N. gonorrhoeae by AMP DNA Urine [[id number]]     Order Status:  Completed Specimen:  Genital            Imaging Results          X-Ray Chest 1 View (Final result)  Result time 12/12/17 05:44:42    Final result by Keyshawn Barr MD (12/12/17 05:44:42)                 Impression:     No significant intrathoracic abnormality.  No significant detrimental interval change in the appearance of the chest since 10/27/16.      Electronically signed by: Keyshawn  "Cortney AKBAR  Date:     12/12/17  Time:    05:44              Narrative:    Comparison is made to 10/26/17.    Heart size is normal, as is the appearance of the pulmonary vascularity.  Lung zones are clear, and free of significant airspace consolidation or volume loss.  No pleural fluid.  No hilar or mediastinal mass lesion.  No pneumothorax.                             CT Head Without Contrast (Final result)  Result time 12/11/17 12:05:59    Final result by Shereen Santiago DO (12/11/17 12:05:59)                 Impression:     Soft tissue swelling/induration overlying the midline occipital calvarium concerning for subcutaneous hematoma without underlying fracture. Clinical correlation advised      Otherwise unremarkable  noncontrast CT head as detailed above specifically without evidence for acute intracranial hemorrhage. Further evaluation as warrented clinically.      Electronically signed by: SHEREEN SANTIAGO DO  Date:     12/11/17  Time:    12:05              Narrative:    CT brain without contrast.    Comparison: None     Technique: Multiple 5 mm axial images of the head were obtained without intravenous contrast.    Results: There is slight focal soft tissue swelling and induration overlying the superior midline occipital calvarium without underlying calvarial fracture suggestive for subcutaneous hematoma. No evidence for acute intracranial hemorrhage or sulcal effacement. The ventricles are normal in size and configuration without evidence for hydrocephalus.  There is no midline shift or mass effect. Visualized paranasal sinuses and mastoid air cells are clear..                              Estimated body mass index is 24.14 kg/m² as calculated from the following:    Height as of this encounter: 5' 10" (1.778 m).    Weight as of this encounter: 76.3 kg (168 lb 3.4 oz).    I & O (Last 24H):    Intake/Output Summary (Last 24 hours) at 12/12/17 1304  Last data filed at 12/11/17 2324   Gross per 24 hour   Intake         "        0 ml   Output              150 ml   Net             -150 ml       Estimated Creatinine Clearance: 113.8 mL/min (based on SCr of 0.9 mg/dL).    ASSESSMENT/PLAN:     Active Problems:    Active Hospital Problems    Diagnosis  POA    *Syncope [R55]latent syphilis in 2016, Had LP in 11/2016 with negative VDRL csf. s/p LP 12/11- non-infectious cytology with pending serology for CSF RPR and VDRL positive. discussed with ID . Penicillin G IV continuous infusion. toxoplasmosis and  HSV. CSF crypto antigen negative.  CT head - unremarkable  noncontrast scan .  telemetry monitoring. EKG - Normal sinus rhythm    Fever of 103F last night. BC x2 obtained. s/p Dermatology and ID evaluation. U/A negative and CX ray -No significant intrathoracic abnormality.     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    s/p punch biopsy x2 for cultures and H&E  Yes    AIDS (acquired immunodeficiency syndrome), CD4 <=200 [B20]AIDS - CD4 count of 164. does not take HIV medication (last use in August 2017) and does not follow-up with infectious disease due to cost issues. does not want  HIV status disclosed with his grandmother and  coworkers  Anemia - 8.6. Normocytic.monitor   Yes      Resolved Hospital Problems    Diagnosis Date Resolved POA   No resolved problems to display.         Disposition- Home    DVT prophylaxis addressed with: Early ambulation            Brock Clement MD  Attending Staff Physician  [facility]  pager- [phone]  Spectraldbm - [phone]

## 2017-12-12 NOTE — NURSING
Tissue specimens/biopsies collected by Sandrita Wade, dermatology resident.  Urine specimen collected by myself, all blood work ordered collected per phlebotomy.  Sputum culture has not been collected as of right now.

## 2017-12-12 NOTE — SUBJECTIVE & OBJECTIVE
Past Medical History:   Diagnosis Date    Anemia of other chronic disease 10/26/2016    HIV (human immunodeficiency virus infection)     Syphilis        Past Surgical History:   Procedure Laterality Date    ANKLE FRACTURE SURGERY      WISDOM TOOTH EXTRACTION         Review of patient's allergies indicates:  No Known Allergies    Medications:  Prescriptions Prior to Admission   Medication Sig    ALBUTEROL INHL Inhale into the lungs.    levoFLOXacin (LEVAQUIN) 500 MG tablet Take 500 mg by mouth every 24 hours.    hydrocodone-acetaminophen 5-325mg (NORCO) 5-325 mg per tablet Take 1 tablet by mouth every 4 (four) hours as needed.    promethazine (PHENERGAN) 25 MG tablet Take 1 tablet (25 mg total) by mouth every 4 (four) hours as needed for Nausea.     Antibiotics     Start     Stop Route Frequency Ordered    12/12/17 0900  moxifloxacin tablet 400 mg      -- Oral Daily 12/11/17 1708        Antifungals     None        Antivirals     None           Immunization History   Administered Date(s) Administered    Influenza - Quadrivalent - PF 10/29/2016    Tdap 10/25/2016       Family History     Problem Relation (Age of Onset)    No Known Problems Mother, Father        Social History     Social History    Marital status: Single     Spouse name: N/A    Number of children: N/A    Years of education: N/A     Social History Main Topics    Smoking status: Former Smoker     Types: Cigarettes    Smokeless tobacco: None    Alcohol use 0.0 oz/week    Drug use: No    Sexual activity: No     Other Topics Concern    None     Social History Narrative    None     Review of Systems   Constitutional: Positive for activity change and appetite change. Negative for chills, diaphoresis, fatigue and fever.   HENT: Negative for nosebleeds, postnasal drip, sinus pain, sinus pressure and sore throat.    Eyes: Negative for photophobia and visual disturbance.   Respiratory: Negative for cough, chest tightness, shortness of breath  and stridor.    Cardiovascular: Negative for chest pain, palpitations and leg swelling.   Gastrointestinal: Negative for abdominal distention, anal bleeding, diarrhea, nausea and vomiting.   Endocrine: Negative for polyphagia and polyuria.   Genitourinary: Negative for difficulty urinating, penile pain, penile swelling and scrotal swelling.   Musculoskeletal: Negative for arthralgias, gait problem and joint swelling.   Skin: Positive for rash.   Allergic/Immunologic: Positive for immunocompromised state.   Neurological: Positive for syncope and weakness. Negative for dizziness, seizures, speech difficulty and headaches.   Hematological: Positive for adenopathy.   Psychiatric/Behavioral: Negative for decreased concentration and dysphoric mood. The patient is not nervous/anxious.      Objective:     Vital Signs (Most Recent):  Temp: (!) 101.2 °F (38.4 °C) (12/12/17 0758)  Pulse: 102 (12/12/17 0800)  Resp: 18 (12/12/17 0758)  BP: 114/66 (12/12/17 0758)  SpO2: 96 % (12/12/17 0758) Vital Signs (24h Range):  Temp:  [97.7 °F (36.5 °C)-103.1 °F (39.5 °C)] 101.2 °F (38.4 °C)  Pulse:  [] 102  Resp:  [14-20] 18  SpO2:  [93 %-100 %] 96 %  BP: (112-151)/() 114/66     Weight: 76.3 kg (168 lb 3.4 oz)  Body mass index is 24.14 kg/m².    Estimated Creatinine Clearance: 93.1 mL/min (based on SCr of 1.1 mg/dL).    Physical Exam   Constitutional: He is oriented to person, place, and time. He appears well-developed and well-nourished.   HENT:   Head: Normocephalic and atraumatic. Microcephalic:      Eyes: EOM are normal. Pupils are equal, round, and reactive to light. Right eye exhibits no discharge. Left eye exhibits no discharge. No scleral icterus.   Neck: Normal range of motion. Neck supple. No JVD present. Erythema present. No tracheal deviation present. No thyromegaly present.       Cardiovascular: Regular rhythm and normal heart sounds.  Exam reveals no gallop and no friction rub.    No murmur heard.  Pulmonary/Chest:  Effort normal and breath sounds normal. No respiratory distress. He has no rales.   Abdominal: Soft. Bowel sounds are normal. He exhibits no distension and no mass. There is no tenderness. There is no rebound and no guarding.   Musculoskeletal: Normal range of motion. He exhibits no edema or deformity.   Lymphadenopathy:     He has no cervical adenopathy.   Neurological: He is alert and oriented to person, place, and time. No cranial nerve deficit. Coordination normal.   Skin: Skin is warm. Capillary refill takes less than 2 seconds. There is erythema.   Psychiatric: He has a normal mood and affect.       Significant Labs:   Blood Culture: No results for input(s): LABBLOO in the last 4320 hours.  C4 Count: No results for input(s): C4 in the last 48 hours.  CBC:   Recent Labs  Lab 12/11/17 0944 12/12/17 0058   WBC 4.24 4.05   HGB 10.4* 8.6*   HCT 33.7* 27.3*   * 420*     CMP:   Recent Labs  Lab 12/11/17 0944      K 4.0      CO2 23   *   BUN 12   CREATININE 1.1   CALCIUM 9.6   PROT 10.6*   ALBUMIN 2.6*   BILITOT 0.4   ALKPHOS 81   AST 36   ALT 16   ANIONGAP 12   EGFRNONAA >60.0     Microbiology Results (last 7 days)     Procedure Component Value Units Date/Time    CSF culture and Gram Stain (Tube 2) [[id number]] Collected:  12/11/17 1346    Order Status:  Completed Specimen:  CSF (Spinal Fluid) from CSF Tap, Tube 2 Updated:  12/12/17 0823     CSF CULTURE No Growth to date     Gram Stain Result No WBC's      No organisms seen    Narrative:       On which sequentially labeled tube should this analysis be  performed?->2    Blood culture [[id number]] Collected:  12/12/17 0058    Order Status:  Sent Specimen:  Blood Updated:  12/12/17 0117    Blood culture [[id number]] Collected:  12/12/17 0058    Order Status:  Sent Specimen:  Blood Updated:  12/12/17 0117    C. trachomatis/N. gonorrhoeae by AMP DNA Urine [[id number]] Collected:  12/11/17 1200    Order Status:  Completed Specimen:  Genital  Updated:  12/11/17 1624     Chlamydia, Amplified DNA Not Detected     N gonorrhoeae, amplified DNA Not Detected    Narrative:       received yellow and gray top    Fungus culture (Tube 3) [[id number]] Collected:  12/11/17 1346    Order Status:  Sent Specimen:  CSF (Spinal Fluid) from CSF Tap, Tube 2 Updated:  12/11/17 1412    Cryptococcal antigen, CSF (Tube 3) [[id number]] Collected:  12/11/17 1346    Order Status:  Sent Specimen:  CSF (Spinal Fluid) from CSF Tap, Tube 2 Updated:  12/11/17 1411    C. trachomatis/N. gonorrhoeae by AMP DNA [[id number]] Collected:  12/11/17 1026    Order Status:  Canceled Updated:  12/11/17 1105    C. trachomatis/N. gonorrhoeae by AMP DNA Urine [[id number]]     Order Status:  Completed Specimen:  Genital             Significant Imaging: I have reviewed all pertinent imaging results/findings within the past 24 hours. CT head  Soft tissue swelling/induration overlying the midline occipital calvarium concerning for subcutaneous hematoma without underlying fracture. Clinical correlation advised  Otherwise unremarkable  noncontrast CT head as detailed above specifically without evidence for acute intracranial hemorrhage. Further evaluation as warrented clinically.    CXR stable

## 2017-12-12 NOTE — CONSULTS
Consult to ID received; see full consult note to follow.    Dipak Carlos MD  PGY-2 House Staff, Internal Medicine

## 2017-12-12 NOTE — ASSESSMENT & PLAN NOTE
-presents in immunocompromised state, with fever, s/p syncope not on ART- CD4 <200  -syncopal event could represent dysautonomia in AIDS vs neurocardiogenic syncopy vs sepsis.   -will recommend treatment of lung OI at this CD4 to include antipseudmonal with cefepime/Cipro, Bactrim for PJP.  -follow results of CSF and micro workup to include syphilis studies.   -continue to follow Derm workup/recs of skin lesion.   -arrange ID follow up on discharge.

## 2017-12-12 NOTE — CONSULTS
"[facility]  Dermatology  Consult Note    Patient Name: Herson Chavez  MRN: [id number]  Admission Date: 12/11/2017  Hospital Length of Stay: 1 days  Attending Physician: Marina Fernandes MD  Primary Care Provider: Becky Sanders MD     Inpatient consult to Dermatology  Consult performed by: JOHN DOZIER  Consult ordered by: BLANQUITA AVELAR        Subjective:     Principal Problem:<principal problem not specified>    HPI:  This is a 36 YO F with past medical history of AIDs (CD4 count 164) noncompliant on medication (last taken [facility] August 2017 2/2 to cost), Hepatitis C, and latent syphilis admitted for LOC with bowel incontinence and admitted for further workup. Negative head CT and LP was performed to rule out infectious processes as he is immunocompromised.  LP with non-infectious cytology and is pending serology for VDRL, toxoplasmosis and  HSV.  Of note patient had latent syphilis in 2016 with LP 11/2016 with negative VDRL in CSF.     Patient states about 3 weeks ago started getting lesions popping out all over body. Asymptomatic and denies any itching or tenderness associated with these except large lip lesion. Start out as small papules that progress to large scaly plaques. Largest one on left lower lip that he keeps band aide on to keep from picking at. Also with one in between anus and scrotum. Denies that they bleed or ulcerate. Denies that any have resolved and just seems to crop up with new small ones with larger ones progressing. Denies any recent travel or contact with cats.  +15 pound weight loss in past month with night sweats. States has a had dry cough since he was sick with "cold" about 2 weeks ago (skin lesions predate this). CXR negative for acute process. Fevers up to since admission 101.2 and blood cultures taken. ID has been consulted.               Past Medical History:   Diagnosis Date    Anemia of other chronic disease 10/26/2016    HIV (human " immunodeficiency virus infection)     Syphilis        Past Surgical History:   Procedure Laterality Date    ANKLE FRACTURE SURGERY      WISDOM TOOTH EXTRACTION       Family History     Problem Relation (Age of Onset)    No Known Problems Mother, Father        Social History Main Topics    Smoking status: Former Smoker     Types: Cigarettes    Smokeless tobacco: Not on file    Alcohol use 0.0 oz/week    Drug use: No    Sexual activity: No     Review of patient's allergies indicates:  No Known Allergies    Medications:  Continuous Infusions:   Scheduled Meds:   moxifloxacin  400 mg Oral Daily     PRN Meds:acetaminophen, hydrocodone-acetaminophen 5-325mg, promethazine    Review of Systems   Constitutional: Positive for fever, weight loss, night sweats and malaise.   HENT: Positive for headaches (Headaches since LP). Negative for sore throat and mouth sores.    Respiratory: Positive for cough.    Genitourinary: Positive for genital sores.   Skin: Positive for rash. Negative for itching.   Neurological: Positive for headaches (Headaches since LP). Negative for focal weakness and numbness.     Objective:     Vital Signs (Most Recent):  Temp: (!) 101.2 °F (38.4 °C) (12/12/17 0758)  Pulse: 102 (12/12/17 0800)  Resp: 18 (12/12/17 0758)  BP: 114/66 (12/12/17 0758)  SpO2: 96 % (12/12/17 0758) Vital Signs (24h Range):  Temp:  [97.7 °F (36.5 °C)-103.1 °F (39.5 °C)] 101.2 °F (38.4 °C)  Pulse:  [] 102  Resp:  [14-20] 18  SpO2:  [93 %-100 %] 96 %  BP: (112-151)/() 114/66     Weight: 76.3 kg (168 lb 3.4 oz)  Body mass index is 24.14 kg/m².    Physical Exam   Constitutional: He appears well-developed.   HENT:   Mouth/Throat: Gums normal.  Lips normal.    Neurological: He is alert.   Psychiatric: He has a normal mood and affect.   Skin:   Areas Examined (abnormalities noted in diagram):   Scalp / Hair Palpated and Inspected  Head / Face Inspection Performed  Neck Inspection Performed  Chest / Axilla Inspection  Performed  Abdomen Inspection Performed  Back Inspection Performed  RUE Inspected  LUE Inspection Performed  RLE Inspected  LLE Inspection Performed                                          Significant Labs: All pertinent labs within the past 24 hours have been reviewed.    Significant Imaging: X-Ray: I have reviewed all pertinent results/findings within the past 24 hours.     Assessment/Plan:   Skin Lesions  Widespread scattered distrubution of asymptomatic lesions with mostly sparing of palms and soles. (Lesion on left palm that states is a cut with dermabond on place). Has systemic symptoms of fever, night sweats and weight loss. Ddx is broad in immunocompromised patient and includes the following: Bacillary angiomatosis vs Molluscum vs Kaposi vs syphilis vs disseminated mycobacterial or fungal infection (such as histoplasmosis, cryptococcus, coccidiosis, paracoccidioides, penicilliosis).    -See procedure note below for punch biopsy x2.    -One for tissue culture (AFB, anaerobes, aerobes, and fungal) to r/o infectious etiologies    -One for H&E   -ID following, appreciate recommendations     Punch biopsy x2  performed after verbal consent including risk of infection, scar, recurrence, need for additional treatment of site. Area prepped with alcohol, anesthetized with approximately 1.0cc of 1% lidocaine with epinephrine. Lesional tissue punched with 4 mm punch biopsy. Hemostasis achieved and biopsy site packed with surgical gel. No complications. Dressing applied. Wound care explained. Patient should leave dressing on for 24 hours than may apply Aquaphor dressing to keep wound moist as healing     Thank you for your consult. I will follow-up with patient. Please contact us if you have any additional questions.    Sandrita Wade MD  Dermatology  [facility]

## 2017-12-12 NOTE — HPI
Mr Chavez is a 36 yo M with advanced HIV (last CD4 12/11/17 164), syphilis, no longer on ART citing medication costs, presents to the ED on 12/11 after a syncopal episode with +LOC for a few seconds, noticed dizziness/clamminess preceding the event. He is a surgical tech at [employer], and was standing with coworkers preceding the event, denies seizure like activity, has mild confusion upon waking. He has recently noted a lesion on his chin 2 weeks prior, with additional findings on neck, arm, trunk. He denies HA, chest pain, cough, SOB, other pain, n/v/d, penile discharge. He last took his ART in August 2017. ID is consulted for ART noncompliance in a patient with AIDS and new skin manifestations, syncope. LP and CSF studies and full micro workup were performed in the ED, results pending. He is currently on Avelox for empiric lung coverage of CAP, and Dermatology has been consulted for skin lesions, biopsies pending.

## 2017-12-12 NOTE — SUBJECTIVE & OBJECTIVE
Past Medical History:   Diagnosis Date    Anemia of other chronic disease 10/26/2016    HIV (human immunodeficiency virus infection)     Syphilis        Past Surgical History:   Procedure Laterality Date    ANKLE FRACTURE SURGERY      WISDOM TOOTH EXTRACTION       Family History     Problem Relation (Age of Onset)    No Known Problems Mother, Father        Social History Main Topics    Smoking status: Former Smoker     Types: Cigarettes    Smokeless tobacco: Not on file    Alcohol use 0.0 oz/week    Drug use: No    Sexual activity: No     Review of patient's allergies indicates:  No Known Allergies    Medications:  Continuous Infusions:   Scheduled Meds:   moxifloxacin  400 mg Oral Daily     PRN Meds:acetaminophen, hydrocodone-acetaminophen 5-325mg, promethazine    Review of Systems   Constitutional: Positive for fever, weight loss, night sweats and malaise.   HENT: Positive for headaches (Headaches since LP). Negative for sore throat and mouth sores.    Respiratory: Positive for cough.    Genitourinary: Positive for genital sores.   Skin: Positive for rash. Negative for itching.   Neurological: Positive for headaches (Headaches since LP). Negative for focal weakness and numbness.     Objective:     Vital Signs (Most Recent):  Temp: (!) 101.2 °F (38.4 °C) (12/12/17 0758)  Pulse: 102 (12/12/17 0800)  Resp: 18 (12/12/17 0758)  BP: 114/66 (12/12/17 0758)  SpO2: 96 % (12/12/17 0758) Vital Signs (24h Range):  Temp:  [97.7 °F (36.5 °C)-103.1 °F (39.5 °C)] 101.2 °F (38.4 °C)  Pulse:  [] 102  Resp:  [14-20] 18  SpO2:  [93 %-100 %] 96 %  BP: (112-151)/() 114/66     Weight: 76.3 kg (168 lb 3.4 oz)  Body mass index is 24.14 kg/m².    Physical Exam   Constitutional: He appears well-developed.   HENT:   Mouth/Throat: Gums normal.  Lips normal.    Neurological: He is alert.   Psychiatric: He has a normal mood and affect.   Skin:   Areas Examined (abnormalities noted in diagram):   Scalp / Hair Palpated and  Inspected  Head / Face Inspection Performed  Neck Inspection Performed  Chest / Axilla Inspection Performed  Abdomen Inspection Performed  Back Inspection Performed  RUE Inspected  LUE Inspection Performed  RLE Inspected  LLE Inspection Performed                                          Significant Labs: All pertinent labs within the past 24 hours have been reviewed.    Significant Imaging: X-Ray: I have reviewed all pertinent results/findings within the past 24 hours.

## 2017-12-12 NOTE — CARE UPDATE
RN Proactive Rounding Note  Time of Visit: 25    Admit Date: 2017  LOS: 1  Code Status: Full Code   Date of Visit: 2017  : 1980  Age: 37 y.o.  Sex: male  Bed: 1109/[facility] A:   MRN: [id number]  Was the patient discharged from an ICU this admission? no   Was the patient discharged from a PACU within last 24 hours? no  Did the patient receive conscious sedation/general anesthesia in last 24 hours? no  Was the patient in the ED within the past 24 hours? yes  Was the patient started on NIPPV within the past 24 hours? no  Attending Physician: Leo Estevez MD  Primary Service: [facility] MED B      ASSESSMENT:     Modified Early Warning Score (MEWS): 5  Abnormal Vital Signs: Temperature 103.1,   Clinical Issues: Circulatory     INTERVENTIONS/ RECOMMENDATIONS:     Called by bedside RNBhavna, for elevated temperature 103.1*F and .  Patient was admitted for a witnessed syncopal episode today at work.  HR was previously in the 90s-100s.  Patient is HIV positive with CD4 count 164 and newly formed blisters on face and rash on body.  LETICIA Knowles, made aware of findings, blood cultures, lactic acid, and tylenol ordered.  Recommend ice packs and other non-pharmacological interventions to AKIL Hughes, to reduce body temperature.    Discussed plan of care with Ellen BARNARD    PHYSICIAN ESCALATION:     Yes/No yes    Orders received and case discussed with LETICIA Abarca     Disposition: remain on unit    FOLLOW-UP/CONTINGENCY:       Call back the Rapid Response Nurse at [phone]  for additional questions or concerns

## 2017-12-12 NOTE — CONSULTS
[facility]  Infectious Disease  Consult Note    Patient Name: Herson Chavez  MRN: [id number]  Admission Date: 12/11/2017  Hospital Length of Stay: 1 days  Attending Physician: Marina Fernandes MD  Primary Care Provider: Becky Sanders MD     Isolation Status: No active isolations    Patient information was obtained from patient, past medical records and ER records.      Consults  Assessment/Plan:     AIDS (acquired immunodeficiency syndrome), CD4 <=200    -presents in immunocompromised state, with fever, s/p syncope not on ART- CD4 <200  -syncopal event could represent dysautonomia in AIDS vs neurocardiogenic syncopy vs sepsis.   -will recommend treatment of with CAP therapy- cont Avelox  -follow results of CSF and micro workup to include syphilis studies.   -continue to follow Derm workup/recs of skin lesion. DDX to include secondary syphilis vs cutaneous manifestation of endemic fungal disease vs bacillary angiomatosis, follow up cultures and fungal assays   -arrange ID follow up on discharge and begin ART as outpatient            Thank you for your consult. I will follow-up with patient. Please contact us if you have any additional questions.    Dipak Carlos MD  Infectious Disease  [facility]    Subjective:     Principal Problem: <principal problem not specified>    HPI: Mr Chavez is a 38 yo M with advanced HIV (last CD4 12/11/17 164), syphilis, no longer on ART citing medication costs, presents to the ED on 12/11 after a syncopal episode with +LOC for a few seconds, noticed dizziness/clamminess preceding the event. 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 He has recently noted a lesion on his chin 2 weeks prior, with additional findings on neck, arm, trunk. He denies HA, chest pain, cough, SOB, other pain, n/v/d, penile discharge. He last took his ART in  August 2017. ID is consulted for ART noncompliance in a patient with AIDS and new skin manifestations, syncope. LP and CSF studies and full micro workup were performed in the ED, results pending. He is currently on Avelox for empiric lung coverage of CAP, and Dermatology has been consulted for skin lesions regarding KS. Patient had been complaining of dry cough with skin rash prior to admission, diaphoresis, fevers at home. He last took Atripla in July 2017.     Past Medical History:   Diagnosis Date    Anemia of other chronic disease 10/26/2016    HIV (human immunodeficiency virus infection)     Syphilis        Past Surgical History:   Procedure Laterality Date    ANKLE FRACTURE SURGERY      WISDOM TOOTH EXTRACTION         Review of patient's allergies indicates:  No Known Allergies    Medications:  Prescriptions Prior to Admission   Medication Sig    ALBUTEROL INHL Inhale into the lungs.    levoFLOXacin (LEVAQUIN) 500 MG tablet Take 500 mg by mouth every 24 hours.    hydrocodone-acetaminophen 5-325mg (NORCO) 5-325 mg per tablet Take 1 tablet by mouth every 4 (four) hours as needed.    promethazine (PHENERGAN) 25 MG tablet Take 1 tablet (25 mg total) by mouth every 4 (four) hours as needed for Nausea.     Antibiotics     Start     Stop Route Frequency Ordered    12/12/17 0900  moxifloxacin tablet 400 mg      -- Oral Daily 12/11/17 1708        Antifungals     None        Antivirals     None           Immunization History   Administered Date(s) Administered    Influenza - Quadrivalent - PF 10/29/2016    Tdap 10/25/2016       Family History     Problem Relation (Age of Onset)    No Known Problems Mother, Father        Social History     Social History    Marital status: Single     Spouse name: N/A    Number of children: N/A    Years of education: N/A     Social History Main Topics    Smoking status: Former Smoker     Types: Cigarettes    Smokeless tobacco: None    Alcohol use 0.0 oz/week    Drug use:  No    Sexual activity: No     Other Topics Concern    None     Social History Narrative    None     Review of Systems   Constitutional: Positive for activity change and appetite change. Negative for chills, diaphoresis, fatigue and fever.   HENT: Negative for nosebleeds, postnasal drip, sinus pain, sinus pressure and sore throat.    Eyes: Negative for photophobia and visual disturbance.   Respiratory: Negative for cough, chest tightness, shortness of breath and stridor.    Cardiovascular: Negative for chest pain, palpitations and leg swelling.   Gastrointestinal: Negative for abdominal distention, anal bleeding, diarrhea, nausea and vomiting.   Endocrine: Negative for polyphagia and polyuria.   Genitourinary: Negative for difficulty urinating, penile pain, penile swelling and scrotal swelling.   Musculoskeletal: Negative for arthralgias, gait problem and joint swelling.   Skin: Positive for rash.   Allergic/Immunologic: Positive for immunocompromised state.   Neurological: Positive for syncope and weakness. Negative for dizziness, seizures, speech difficulty and headaches.   Hematological: Positive for adenopathy.   Psychiatric/Behavioral: Negative for decreased concentration and dysphoric mood. The patient is not nervous/anxious.      Objective:     Vital Signs (Most Recent):  Temp: (!) 101.2 °F (38.4 °C) (12/12/17 0758)  Pulse: 102 (12/12/17 0800)  Resp: 18 (12/12/17 0758)  BP: 114/66 (12/12/17 0758)  SpO2: 96 % (12/12/17 0758) Vital Signs (24h Range):  Temp:  [97.7 °F (36.5 °C)-103.1 °F (39.5 °C)] 101.2 °F (38.4 °C)  Pulse:  [] 102  Resp:  [14-20] 18  SpO2:  [93 %-100 %] 96 %  BP: (112-151)/() 114/66     Weight: 76.3 kg (168 lb 3.4 oz)  Body mass index is 24.14 kg/m².    Estimated Creatinine Clearance: 93.1 mL/min (based on SCr of 1.1 mg/dL).    Physical Exam   Constitutional: He is oriented to person, place, and time. He appears well-developed and well-nourished.   HENT:   Head: Normocephalic and  atraumatic. Microcephalic:      Eyes: EOM are normal. Pupils are equal, round, and reactive to light. Right eye exhibits no discharge. Left eye exhibits no discharge. No scleral icterus.   Neck: Normal range of motion. Neck supple. No JVD present. Erythema present. No tracheal deviation present. No thyromegaly present.       Cardiovascular: Regular rhythm and normal heart sounds.  Exam reveals no gallop and no friction rub.    No murmur heard.  Pulmonary/Chest: Effort normal and breath sounds normal. No respiratory distress. He has no rales.   Abdominal: Soft. Bowel sounds are normal. He exhibits no distension and no mass. There is no tenderness. There is no rebound and no guarding.   Musculoskeletal: Normal range of motion. He exhibits no edema or deformity.   Lymphadenopathy:     He has no cervical adenopathy.   Neurological: He is alert and oriented to person, place, and time. No cranial nerve deficit. Coordination normal.   Skin: Skin is warm. Capillary refill takes less than 2 seconds. There is erythema.   Psychiatric: He has a normal mood and affect. +rash- vesicular papules intermittently on chin, limbs, trunk with fungating central punctum      Significant Labs:   Blood Culture: No results for input(s): LABBLOO in the last 4320 hours.  C4 Count: No results for input(s): C4 in the last 48 hours.  CBC:   Recent Labs  Lab 12/11/17  0944 12/12/17  0058   WBC 4.24 4.05   HGB 10.4* 8.6*   HCT 33.7* 27.3*   * 420*     CMP:   Recent Labs  Lab 12/11/17  0944      K 4.0      CO2 23   *   BUN 12   CREATININE 1.1   CALCIUM 9.6   PROT 10.6*   ALBUMIN 2.6*   BILITOT 0.4   ALKPHOS 81   AST 36   ALT 16   ANIONGAP 12   EGFRNONAA >60.0     Microbiology Results (last 7 days)     Procedure Component Value Units Date/Time    CSF culture and Gram Stain (Tube 2) [[id number]] Collected:  12/11/17 1346    Order Status:  Completed Specimen:  CSF (Spinal Fluid) from CSF Tap, Tube 2 Updated:  12/12/17 7414      CSF CULTURE No Growth to date     Gram Stain Result No WBC's      No organisms seen    Narrative:       On which sequentially labeled tube should this analysis be  performed?->2    Blood culture [[id number]] Collected:  12/12/17 0058    Order Status:  Sent Specimen:  Blood Updated:  12/12/17 0117    Blood culture [[id number]] Collected:  12/12/17 0058    Order Status:  Sent Specimen:  Blood Updated:  12/12/17 0117    C. trachomatis/N. gonorrhoeae by AMP DNA Urine [[id number]] Collected:  12/11/17 1200    Order Status:  Completed Specimen:  Genital Updated:  12/11/17 1624     Chlamydia, Amplified DNA Not Detected     N gonorrhoeae, amplified DNA Not Detected    Narrative:       received yellow and gray top    Fungus culture (Tube 3) [[id number]] Collected:  12/11/17 1346    Order Status:  Sent Specimen:  CSF (Spinal Fluid) from CSF Tap, Tube 2 Updated:  12/11/17 1412    Cryptococcal antigen, CSF (Tube 3) [[id number]] Collected:  12/11/17 1346    Order Status:  Sent Specimen:  CSF (Spinal Fluid) from CSF Tap, Tube 2 Updated:  12/11/17 1411    C. trachomatis/N. gonorrhoeae by AMP DNA [[id number]] Collected:  12/11/17 1026    Order Status:  Canceled Updated:  12/11/17 1105    C. trachomatis/N. gonorrhoeae by AMP DNA Urine [[id number]]     Order Status:  Completed Specimen:  Genital             Significant Imaging: I have reviewed all pertinent imaging results/findings within the past 24 hours. CT head  Soft tissue swelling/induration overlying the midline occipital calvarium concerning for subcutaneous hematoma without underlying fracture. Clinical correlation advised  Otherwise unremarkable  noncontrast CT head as detailed above specifically without evidence for acute intracranial hemorrhage. Further evaluation as warrented clinically.    CXR stable

## 2017-12-13 PROBLEM — A52.3 NEUROSYPHILIS: Status: ACTIVE | Noted: 2017-12-13

## 2017-12-13 LAB
ALBUMIN SERPL BCP-MCNC: 2.2 G/DL
ALP SERPL-CCNC: 78 U/L
ALT SERPL W/O P-5'-P-CCNC: 17 U/L
ANION GAP SERPL CALC-SCNC: 9 MMOL/L
AST SERPL-CCNC: 31 U/L
BASOPHILS # BLD AUTO: 0 K/UL
BASOPHILS NFR BLD: 0 %
BILIRUB SERPL-MCNC: 0.4 MG/DL
BUN SERPL-MCNC: 10 MG/DL
CALCIUM SERPL-MCNC: 9 MG/DL
CHLORIDE SERPL-SCNC: 100 MMOL/L
CO2 SERPL-SCNC: 26 MMOL/L
CREAT SERPL-MCNC: 0.9 MG/DL
CRYPTOC AG SER QL LA: NEGATIVE
DIASTOLIC DYSFUNCTION: NO
DIFFERENTIAL METHOD: ABNORMAL
EOSINOPHIL # BLD AUTO: 0 K/UL
EOSINOPHIL NFR BLD: 0 %
ERYTHROCYTE [DISTWIDTH] IN BLOOD BY AUTOMATED COUNT: 14.7 %
EST. GFR  (AFRICAN AMERICAN): >60 ML/MIN/1.73 M^2
EST. GFR  (NON AFRICAN AMERICAN): >60 ML/MIN/1.73 M^2
ESTIMATED PA SYSTOLIC PRESSURE: 27.4
GLUCOSE SERPL-MCNC: 104 MG/DL
HCT VFR BLD AUTO: 30 %
HGB BLD-MCNC: 9.6 G/DL
IMM GRANULOCYTES # BLD AUTO: 0.04 K/UL
IMM GRANULOCYTES NFR BLD AUTO: 0.7 %
LACTATE SERPL-SCNC: 1.2 MMOL/L
LYMPHOCYTES # BLD AUTO: 1.2 K/UL
LYMPHOCYTES NFR BLD: 21.9 %
MCH RBC QN AUTO: 29.7 PG
MCHC RBC AUTO-ENTMCNC: 32 G/DL
MCV RBC AUTO: 93 FL
MONOCYTES # BLD AUTO: 0.7 K/UL
MONOCYTES NFR BLD: 13 %
NEUTROPHILS # BLD AUTO: 3.5 K/UL
NEUTROPHILS NFR BLD: 64.4 %
NRBC BLD-RTO: 0 /100 WBC
PLATELET # BLD AUTO: 373 K/UL
PMV BLD AUTO: 9 FL
POTASSIUM SERPL-SCNC: 4.5 MMOL/L
PROT SERPL-MCNC: 9.3 G/DL
RBC # BLD AUTO: 3.23 M/UL
RETIRED EF AND QEF - SEE NOTES: 60 (ref 55–65)
SODIUM SERPL-SCNC: 135 MMOL/L
SPECIMEN SOURCE: NORMAL
T GONDII DNA CSF QL NAA+PROBE: NEGATIVE
WBC # BLD AUTO: 5.39 K/UL

## 2017-12-13 PROCEDURE — 99233 SBSQ HOSP IP/OBS HIGH 50: CPT | Mod: ,,, | Performed by: HOSPITALIST

## 2017-12-13 PROCEDURE — 93005 ELECTROCARDIOGRAM TRACING: CPT

## 2017-12-13 PROCEDURE — 25000003 PHARM REV CODE 250: Performed by: PHYSICIAN ASSISTANT

## 2017-12-13 PROCEDURE — 80053 COMPREHEN METABOLIC PANEL: CPT

## 2017-12-13 PROCEDURE — 93306 TTE W/DOPPLER COMPLETE: CPT | Mod: 26,,, | Performed by: INTERNAL MEDICINE

## 2017-12-13 PROCEDURE — 25000003 PHARM REV CODE 250: Performed by: INTERNAL MEDICINE

## 2017-12-13 PROCEDURE — 11000001 HC ACUTE MED/SURG PRIVATE ROOM

## 2017-12-13 PROCEDURE — 93010 ELECTROCARDIOGRAM REPORT: CPT | Mod: ,,, | Performed by: INTERNAL MEDICINE

## 2017-12-13 PROCEDURE — 99232 SBSQ HOSP IP/OBS MODERATE 35: CPT | Mod: ,,, | Performed by: INTERNAL MEDICINE

## 2017-12-13 PROCEDURE — 87040 BLOOD CULTURE FOR BACTERIA: CPT

## 2017-12-13 PROCEDURE — 93306 TTE W/DOPPLER COMPLETE: CPT

## 2017-12-13 PROCEDURE — 83605 ASSAY OF LACTIC ACID: CPT

## 2017-12-13 PROCEDURE — 63600175 PHARM REV CODE 636 W HCPCS: Performed by: INTERNAL MEDICINE

## 2017-12-13 PROCEDURE — 36415 COLL VENOUS BLD VENIPUNCTURE: CPT

## 2017-12-13 PROCEDURE — 25000003 PHARM REV CODE 250: Performed by: HOSPITALIST

## 2017-12-13 PROCEDURE — 85025 COMPLETE CBC W/AUTO DIFF WBC: CPT

## 2017-12-13 RX ORDER — MOXIFLOXACIN HYDROCHLORIDE 400 MG/1
400 TABLET ORAL DAILY
Status: DISCONTINUED | OUTPATIENT
Start: 2017-12-13 | End: 2017-12-16 | Stop reason: HOSPADM

## 2017-12-13 RX ORDER — MOXIFLOXACIN HYDROCHLORIDE 400 MG/1
400 TABLET ORAL DAILY
Status: DISCONTINUED | OUTPATIENT
Start: 2017-12-14 | End: 2017-12-13

## 2017-12-13 RX ADMIN — SODIUM CHLORIDE 1000 ML: 0.9 INJECTION, SOLUTION INTRAVENOUS at 12:12

## 2017-12-13 RX ADMIN — MOXIFLOXACIN HYDROCHLORIDE 400 MG: 400 TABLET, FILM COATED ORAL at 04:12

## 2017-12-13 RX ADMIN — ACETAMINOPHEN 650 MG: 325 TABLET ORAL at 08:12

## 2017-12-13 RX ADMIN — DEXTROSE MONOHYDRATE 20 MILLION UNITS: 50 INJECTION, SOLUTION INTRAVENOUS at 09:12

## 2017-12-13 RX ADMIN — SODIUM CHLORIDE: 9 INJECTION, SOLUTION INTRAVENOUS at 04:12

## 2017-12-13 RX ADMIN — SODIUM CHLORIDE 500 ML: 0.9 INJECTION, SOLUTION INTRAVENOUS at 04:12

## 2017-12-13 RX ADMIN — ACETAMINOPHEN 650 MG: 325 TABLET ORAL at 04:12

## 2017-12-13 RX ADMIN — HYDROCODONE BITARTRATE AND ACETAMINOPHEN 1 TABLET: 5; 325 TABLET ORAL at 02:12

## 2017-12-13 RX ADMIN — SODIUM CHLORIDE 1000 ML: 0.9 INJECTION, SOLUTION INTRAVENOUS at 08:12

## 2017-12-13 NOTE — CARE UPDATE
RN Proactive Rounding Note  Time of Visit:     Admit Date: 2017  LOS: 1  Code Status: Full Code   Date of Visit: 2017  : 1980  Age: 37 y.o.  Sex: male  Bed: [facility]9/[facility] A:   MRN: [id number]  Was the patient discharged from an ICU this admission? No   Was the patient discharged from a PACU within last 24 hours? no  Did the patient receive conscious sedation/general anesthesia in last 24 hours? No  Was the patient in the ED within the past 24 hours? Yes  Was the patient started on NIPPV within the past 24 hours? No  Attending Physician: Brock Clement MD  Primary Service: [facility]      ASSESSMENT:     Modified Early Warning Score (MEWS): 4  Abnormal Vital Signs: febrile  Clinical Issues: Circulatory     INTERVENTIONS/ RECOMMENDATIONS:     Penicillin G IV continuous infusion started for CSF VDRL (+), blood RPR (+) 1:256, consistent with neurosyphilis. Will continue to monitor.    Discussed plan of care with RN Yes    PHYSICIAN ESCALATION:     Yes/No No    Orders received and case discussed with   N/A     Disposition: remain in room 1109A    FOLLOW-UP/CONTINGENCY:       Call back the Rapid Response Nurse at x [phone]  for additional questions or concerns

## 2017-12-13 NOTE — PROGRESS NOTES
Rapid Response Follow-up Note    Followed up with patient for proactive rounding.   No acute issues at this time. Vital signs within normal limits  Reviewed plan of care with primary RN Keri.   Please call Rapid Response AKIL Baez with any questions or concerns at  [phone]

## 2017-12-13 NOTE — SUBJECTIVE & OBJECTIVE
Interval History: CSF VDRL +; initiated PCN overnight. Continues to be febrile and tachycardic.     Review of Systems   Constitutional: Positive for activity change and appetite change. Negative for chills, diaphoresis, fatigue and fever.   HENT: Negative for nosebleeds, postnasal drip, sinus pain, sinus pressure and sore throat.    Eyes: Negative for photophobia and visual disturbance.   Respiratory: Negative for cough, chest tightness, shortness of breath and stridor.    Cardiovascular: Negative for chest pain, palpitations and leg swelling.   Gastrointestinal: Negative for abdominal distention, anal bleeding, diarrhea, nausea and vomiting.   Endocrine: Negative for polyphagia and polyuria.   Genitourinary: Negative for difficulty urinating, penile pain, penile swelling and scrotal swelling.   Musculoskeletal: Negative for arthralgias, gait problem and joint swelling.   Skin: Positive for rash.   Allergic/Immunologic: Positive for immunocompromised state.   Neurological: Positive for syncope and weakness. Negative for dizziness, seizures, speech difficulty and headaches.   Hematological: Positive for adenopathy.   Psychiatric/Behavioral: Negative for decreased concentration and dysphoric mood. The patient is not nervous/anxious.      Objective:     Vital Signs (Most Recent):  Temp: (!) 103.1 °F (39.5 °C) (12/13/17 0801)  Pulse: (!) 125 (12/13/17 0801)  Resp: 18 (12/13/17 0801)  BP: 107/66 (12/13/17 0801)  SpO2: 96 % (12/13/17 0801) Vital Signs (24h Range):  Temp:  [98.4 °F (36.9 °C)-103.1 °F (39.5 °C)] 103.1 °F (39.5 °C)  Pulse:  [] 125  Resp:  [16-18] 18  SpO2:  [94 %-98 %] 96 %  BP: (104-123)/(60-75) 107/66     Weight: 76.3 kg (168 lb 3.4 oz)  Body mass index is 24.14 kg/m².    Estimated Creatinine Clearance: 113.8 mL/min (based on SCr of 0.9 mg/dL).    Physical Exam   Constitutional: He is oriented to person, place, and time. He appears well-developed and well-nourished.   HENT:   Head: Normocephalic and  atraumatic. Microcephalic:      Eyes: EOM are normal. Pupils are equal, round, and reactive to light. Right eye exhibits no discharge. Left eye exhibits no discharge. No scleral icterus.   Neck: Normal range of motion. Neck supple. No JVD present. Erythema present. No tracheal deviation present. No thyromegaly present.       Cardiovascular: Regular rhythm and normal heart sounds.  Exam reveals no gallop and no friction rub.    No murmur heard.  Pulmonary/Chest: Effort normal and breath sounds normal. No respiratory distress. He has no rales.   Abdominal: Soft. Bowel sounds are normal. He exhibits no distension and no mass. There is no tenderness. There is no rebound and no guarding.   Musculoskeletal: Normal range of motion. He exhibits no edema or deformity.   Lymphadenopathy:     He has no cervical adenopathy.   Neurological: He is alert and oriented to person, place, and time. No cranial nerve deficit. Coordination normal.   Skin: Skin is warm. Capillary refill takes less than 2 seconds. Rash noted. There is erythema.   Multiple raised papular lesions with central umbilication   Psychiatric: He has a normal mood and affect.       Significant Labs:   C4 Count: No results for input(s): C4 in the last 48 hours.  CBC:   Recent Labs  Lab 12/11/17  0944 12/12/17  0058 12/13/17  0452   WBC 4.24 4.05 5.39   HGB 10.4* 8.6* 9.6*   HCT 33.7* 27.3* 30.0*   * 420* 373*     CMP:   Recent Labs  Lab 12/11/17  0944 12/12/17  0821 12/13/17  0452    134* 135*   K 4.0 3.9 4.5    100 100   CO2 23 27 26   * 119* 104   BUN 12 8 10   CREATININE 1.1 0.9 0.9   CALCIUM 9.6 9.0 9.0   PROT 10.6* 9.1* 9.3*   ALBUMIN 2.6* 2.2* 2.2*   BILITOT 0.4 0.4 0.4   ALKPHOS 81 78 78   AST 36 33 31   ALT 16 16 17   ANIONGAP 12 7* 9   EGFRNONAA >60.0 >60.0 >60.0     Urine Studies:   Recent Labs  Lab 12/11/17  1026   COLORU Deepika   APPEARANCEUA Hazy*   PHUR 5.0   SPECGRAV >=1.030*   PROTEINUA 2+*   GLUCUA Negative   KETONESU  Negative   BILIRUBINUA Negative   OCCULTUA 1+*   NITRITE Negative   UROBILINOGEN Negative   LEUKOCYTESUR Negative   RBCUA 1   WBCUA 5   BACTERIA Few*   SQUAMEPITHEL 0   HYALINECASTS 7*     All pertinent labs within the past 24 hours have been reviewed.  Microbiology Results (last 7 days)     Procedure Component Value Units Date/Time    Blood culture [[id number]]     Order Status:  Sent Specimen:  Blood     Blood culture [[id number]]     Order Status:  Sent Specimen:  Blood     Blood culture [[id number]] Collected:  12/12/17 0058    Order Status:  Completed Specimen:  Blood Updated:  12/13/17 0613     Blood Culture, Routine No Growth to date     Blood Culture, Routine No Growth to date    Blood culture [[id number]] Collected:  12/12/17 0058    Order Status:  Completed Specimen:  Blood Updated:  12/13/17 0613     Blood Culture, Routine No Growth to date     Blood Culture, Routine No Growth to date    AFB Culture & Smear [[id number]] Collected:  12/12/17 1530    Order Status:  Sent Specimen:  Biopsy from Arm, Left Updated:  12/12/17 1641    Fungal culture , skin, hair, or nails [[id number]] Collected:  12/12/17 1529    Order Status:  Sent Specimen:  Skin, Hair, Nail from Skin Updated:  12/12/17 1640    Aerobic culture [[id number]] Collected:  12/12/17 1530    Order Status:  Sent Specimen:  Biopsy from Arm, Left Updated:  12/12/17 1638    Culture, Anaerobe [[id number]] Collected:  12/12/17 1530    Order Status:  Sent Specimen:  Biopsy from Arm, Left Updated:  12/12/17 1638    Cryptococcal antigen, CSF (Tube 3) [[id number]] Collected:  12/11/17 1346    Order Status:  Completed Specimen:  CSF (Spinal Fluid) from CSF Tap, Tube 2 Updated:  12/12/17 1232     Crypto Ag, CSF Negative    Narrative:       On which sequentially labeled tube should this analysis be  performed?->3    Cryptococcal antigen, CSF [[id number]] Collected:  12/11/17 1350    Order Status:  Completed Specimen:  CSF (Spinal Fluid) Updated:  12/12/17 1230     Crypto  Ag, CSF Negative    Narrative:       Add on order [id number] Crypto. Per MD Terrance  12/12/2017  10:32     Cryptococcal antigen [[id number]] Collected:  12/12/17 0958    Order Status:  Sent Specimen:  Blood from Blood Updated:  12/12/17 1008    Cryptococcal antigen, CSF [[id number]]     Order Status:  Completed Specimen:  CSF (Spinal Fluid)     AFB Culture & Smear [[id number]]     Order Status:  Canceled Specimen:  Skin from Arm, Left     Aerobic culture [[id number]]     Order Status:  Canceled Specimen:  Skin from Arm, Left     Culture, Anaerobe [[id number]]     Order Status:  Canceled Specimen:  Skin from Arm, Left     Culture, Respiratory with Gram Stain [[id number]]     Order Status:  No result Specimen:  Respiratory     CSF culture and Gram Stain (Tube 2) [[id number]] Collected:  12/11/17 1346    Order Status:  Completed Specimen:  CSF (Spinal Fluid) from CSF Tap, Tube 2 Updated:  12/12/17 0823     CSF CULTURE No Growth to date     Gram Stain Result No WBC's      No organisms seen    Narrative:       On which sequentially labeled tube should this analysis be  performed?->2    C. trachomatis/N. gonorrhoeae by AMP DNA Urine [[id number]] Collected:  12/11/17 1200    Order Status:  Completed Specimen:  Genital Updated:  12/11/17 1624     Chlamydia, Amplified DNA Not Detected     N gonorrhoeae, amplified DNA Not Detected    Narrative:       received yellow and gray top    Fungus culture (Tube 3) [[id number]] Collected:  12/11/17 1346    Order Status:  Sent Specimen:  CSF (Spinal Fluid) from CSF Tap, Tube 2 Updated:  12/11/17 1412    C. trachomatis/N. gonorrhoeae by AMP DNA [[id number]] Collected:  12/11/17 1026    Order Status:  Canceled Updated:  12/11/17 1105    C. trachomatis/N. gonorrhoeae by AMP DNA Urine [[id number]]     Order Status:  Completed Specimen:  Genital           Significant Imaging: I have reviewed all pertinent imaging results/findings within the past 24 hours.

## 2017-12-13 NOTE — PROGRESS NOTES
Progress Note  Hospital Medicine    Admit Date: 12/11/2017  Length of Stay:  LOS: 2 days     SUBJECTIVE:         Follow-up For:  AIDS (acquired immunodeficiency syndrome), CD4 <=200    HPI/Interval history (See H&P for complete P,F,SHx) :   12/12/17  s/p LP- non-infectious cytology with pending serology for VDRL, toxoplasmosis and  HSV.Fever of 103F last night. BC x2 obtained. s/p Dermatology and ID evaluation. s/p punch biopsy x2 for cultures and H&E    12/13/17  Fever with sinus tachycardia. NS Bolus 1L x 2 and continuous hydration    Review of Systems: List if applicable  Pain scale: 0 /10  Mild headache since lumbar puncture  Mild left knee pain    OBJECTIVE:     Vital Signs Range (Last 24H):  Temp:  [98.4 °F (36.9 °C)-103.1 °F (39.5 °C)]   Pulse:  []   Resp:  [16-18]   BP: (104-123)/(60-72)   SpO2:  [94 %-98 %]     Physical Exam:  General- Patient alert and oriented x3   HEENT- PERRLA, EOMI, OP clear  Neck- No JVD, Lymphadenopathy, Thyromegaly  CV- Regular rate and rhythm, No Murmur/carlos/rubs  Resp- Lungs CTA Bilaterally, No increased WOB  Abdomen- Non tender/non-distended, BS normoactive x4 quads, no HSM  Extrem- No cyanosis, clubbing, edema.   Skin- No rashes, lesions, ulcers,  scattered distrubution of  lesions with sparing of palms and soles.   Neuro- Strength 5/5 flexors/extensors,Intact sensation to light touch grossly      Medications:  Medication list was reviewed and changes noted under Assessment/Plan.      Current Facility-Administered Medications:     0.9%  NaCl infusion, , Intravenous, Continuous, Brock Clement MD, Stopped at 12/13/17 0810    acetaminophen tablet 650 mg, 650 mg, Oral, Q6H PRN, Shimon Elliott PA-C, 650 mg at 12/13/17 0822    hydrocodone-acetaminophen 5-325mg per tablet 1 tablet, 1 tablet, Oral, Q4H PRN, Leo Estevez MD    penicillin G potassium 20 Million Units in dextrose 5 % 500 mL CONTINUOUS INFUSION, 20 Million Units, Intravenous, Q24H, Frankie Bey  MD Sar[place], Last Rate: 20.8 mL/hr at 12/12/17 2145, 20 Million Units at 12/12/17 2145    promethazine tablet 25 mg, 25 mg, Oral, Q4H PRN, Leo Estevez MD    acetaminophen, hydrocodone-acetaminophen 5-325mg, promethazine    Laboratory/Diagnostic Data:  Reviewed and noted in plan where applicable- Please see chart for full lab data.      Recent Labs  Lab 12/11/17 0944 12/12/17 0058 12/13/17 0452   WBC 4.24 4.05 5.39   HGB 10.4* 8.6* 9.6*   HCT 33.7* 27.3* 30.0*   * 420* 373*         Recent Labs  Lab 12/11/17 0944 12/12/17  0821 12/13/17 0452    134* 135*   K 4.0 3.9 4.5    100 100   CO2 23 27 26   BUN 12 8 10   CREATININE 1.1 0.9 0.9   * 119* 104   CALCIUM 9.6 9.0 9.0         Recent Labs  Lab 12/11/17 0944 12/12/17  0821 12/13/17 0452   ALKPHOS 81 78 78   ALT 16 16 17   AST 36 33 31   ALBUMIN 2.6* 2.2* 2.2*   PROT 10.6* 9.1* 9.3*   BILITOT 0.4 0.4 0.4        Microbiology labs for the last week  Microbiology Results (last 7 days)     Procedure Component Value Units Date/Time    Blood culture [[id number]] Collected:  12/13/17 0845    Order Status:  Sent Specimen:  Blood Updated:  12/13/17 0958    Blood culture [[id number]] Collected:  12/13/17 0845    Order Status:  Sent Specimen:  Blood Updated:  12/13/17 0958    Aerobic culture [[id number]] Collected:  12/12/17 1530    Order Status:  Completed Specimen:  Biopsy from Arm, Left Updated:  12/13/17 0940     Aerobic Bacterial Culture No growth    Culture, Anaerobe [[id number]] Collected:  12/12/17 1530    Order Status:  Completed Specimen:  Biopsy from Arm, Left Updated:  12/13/17 0902     Anaerobic Culture Culture in progress    CSF culture and Gram Stain (Tube 2) [[id number]] Collected:  12/11/17 1346    Order Status:  Completed Specimen:  CSF (Spinal Fluid) from CSF Tap, Tube 2 Updated:  12/13/17 0849     CSF CULTURE No Growth to date     Gram Stain Result No WBC's      No organisms seen    Narrative:       On which sequentially labeled  tube should this analysis be  performed?->2    Blood culture [[id number]] Collected:  12/12/17 0058    Order Status:  Completed Specimen:  Blood Updated:  12/13/17 0613     Blood Culture, Routine No Growth to date     Blood Culture, Routine No Growth to date    Blood culture [[id number]] Collected:  12/12/17 0058    Order Status:  Completed Specimen:  Blood Updated:  12/13/17 0613     Blood Culture, Routine No Growth to date     Blood Culture, Routine No Growth to date    AFB Culture & Smear [[id number]] Collected:  12/12/17 1530    Order Status:  Sent Specimen:  Biopsy from Arm, Left Updated:  12/12/17 1641    Fungal culture , skin, hair, or nails [[id number]] Collected:  12/12/17 1529    Order Status:  Sent Specimen:  Skin, Hair, Nail from Skin Updated:  12/12/17 1640    Cryptococcal antigen, CSF (Tube 3) [[id number]] Collected:  12/11/17 1346    Order Status:  Completed Specimen:  CSF (Spinal Fluid) from CSF Tap, Tube 2 Updated:  12/12/17 1232     Crypto Ag, CSF Negative    Narrative:       On which sequentially labeled tube should this analysis be  performed?->3    Cryptococcal antigen, CSF [[id number]] Collected:  12/11/17 1350    Order Status:  Completed Specimen:  CSF (Spinal Fluid) Updated:  12/12/17 1230     Crypto Ag, CSF Negative    Narrative:       Add on order [id number] Crypto. Per MD Terrance  12/12/2017  10:32     Cryptococcal antigen [[id number]] Collected:  12/12/17 0958    Order Status:  Sent Specimen:  Blood from Blood Updated:  12/12/17 1008    Cryptococcal antigen, CSF [[id number]]     Order Status:  Completed Specimen:  CSF (Spinal Fluid)     AFB Culture & Smear [[id number]]     Order Status:  Canceled Specimen:  Skin from Arm, Left     Aerobic culture [[id number]]     Order Status:  Canceled Specimen:  Skin from Arm, Left     Culture, Anaerobe [[id number]]     Order Status:  Canceled Specimen:  Skin from Arm, Left     Culture, Respiratory with Gram Stain [[id number]]     Order Status:  No result Specimen:   Respiratory     C. trachomatis/N. gonorrhoeae by AMP DNA Urine [[id number]] Collected:  12/11/17 1200    Order Status:  Completed Specimen:  Genital Updated:  12/11/17 1624     Chlamydia, Amplified DNA Not Detected     N gonorrhoeae, amplified DNA Not Detected    Narrative:       received yellow and gray top    Fungus culture (Tube 3) [[id number]] Collected:  12/11/17 1346    Order Status:  Sent Specimen:  CSF (Spinal Fluid) from CSF Tap, Tube 2 Updated:  12/11/17 1412    C. trachomatis/N. gonorrhoeae by AMP DNA [[id number]] Collected:  12/11/17 1026    Order Status:  Canceled Updated:  12/11/17 1105    C. trachomatis/N. gonorrhoeae by AMP DNA Urine [[id number]]     Order Status:  Completed Specimen:  Genital            Imaging Results          X-Ray Chest 1 View (Final result)  Result time 12/12/17 05:44:42    Final result by Keyshawn Barr MD (12/12/17 05:44:42)                 Impression:     No significant intrathoracic abnormality.  No significant detrimental interval change in the appearance of the chest since 10/27/16.      Electronically signed by: Keyshawn Barr MD  Date:     12/12/17  Time:    05:44              Narrative:    Comparison is made to 10/26/17.    Heart size is normal, as is the appearance of the pulmonary vascularity.  Lung zones are clear, and free of significant airspace consolidation or volume loss.  No pleural fluid.  No hilar or mediastinal mass lesion.  No pneumothorax.                             CT Head Without Contrast (Final result)  Result time 12/11/17 12:05:59    Final result by Brain Wong DO (12/11/17 12:05:59)                 Impression:     Soft tissue swelling/induration overlying the midline occipital calvarium concerning for subcutaneous hematoma without underlying fracture. Clinical correlation advised      Otherwise unremarkable  noncontrast CT head as detailed above specifically without evidence for acute intracranial hemorrhage. Further evaluation as warrented  "clinically.      Electronically signed by: SHEREEN SANTIAGO   Date:     12/11/17  Time:    12:05              Narrative:    CT brain without contrast.    Comparison: None     Technique: Multiple 5 mm axial images of the head were obtained without intravenous contrast.    Results: There is slight focal soft tissue swelling and induration overlying the superior midline occipital calvarium without underlying calvarial fracture suggestive for subcutaneous hematoma. No evidence for acute intracranial hemorrhage or sulcal effacement. The ventricles are normal in size and configuration without evidence for hydrocephalus.  There is no midline shift or mass effect. Visualized paranasal sinuses and mastoid air cells are clear..                              Estimated body mass index is 24.14 kg/m² as calculated from the following:    Height as of this encounter: 5' 10" (1.778 m).    Weight as of this encounter: 76.3 kg (168 lb 3.4 oz).    I & O (Last 24H):    Intake/Output Summary (Last 24 hours) at 12/13/17 1210  Last data filed at 12/12/17 1800   Gross per 24 hour   Intake              390 ml   Output              400 ml   Net              -10 ml       Estimated Creatinine Clearance: 113.8 mL/min (based on SCr of 0.9 mg/dL).    ASSESSMENT/PLAN:     Active Problems:    Active Hospital Problems    Diagnosis  POA    *Syncope [R55]latent syphilis in 2016,s/p LP 12/11- non-infectious cytology with pending serology for CSF. Echo - Normal left ventricular systolic function (EF 60-65%).  No wall motion abnormalities    Neurosyphilis -  RPR and VDRL positive. HSV PCR and CSF crypto antigen negative. discussed with ID . Penicillin G IV continuous infusion. toxoplasmosis and  HSV. CSF crypto antigen negative.  CT head - unremarkable  noncontrast scan .  telemetry monitoring. EKG - Normal sinus rhythm    Fever with sinus tachycardia. NS Bolus 1L x 2 and continuous hydration. BC x2 obtained. s/p Dermatology and ID evaluation. U/A negative " and CX ray -No significant intrathoracic abnormality. 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    s/p punch biopsy x2 for cultures and H&E  Yes    AIDS (acquired immunodeficiency syndrome), CD4 <=200 [B20]AIDS - CD4 count of 164. does not take HIV medication (last use in August 2017) and does not follow-up with infectious disease due to cost issues. does not want  HIV status disclosed with his grandmother and  coworkers  Anemia - 8.6-->9.6. Normocytic.monitor   Yes      Resolved Hospital Problems    Diagnosis Date Resolved POA   No resolved problems to display.         Disposition- Home    DVT prophylaxis addressed with: Early ambulation            Brock Clement MD  Attending Staff Physician  [facility]  pager- [phone] [facility] - [phone]

## 2017-12-13 NOTE — NURSING
Pt continues to have HR in 130-140's.  No distress noted.  Pt has had temperature majority of day.  Tylenol ordered PRN temp >101.  Pt has not had any complaints of pain or discomfort.  Explained plan of care with pt and grandmother.  Pt verbalized understanding.  Call light within reach.  Will continue to monitor.

## 2017-12-13 NOTE — PLAN OF CARE
Problem: Patient Care Overview  Goal: Plan of Care Review  Outcome: Ongoing (interventions implemented as appropriate)  Patient awake, alert, and oriented. Patient's vitals remain stable; prn tylenol given for temp greater than 101. Patient remains free from falls/injury throughout shift. Continuous cardiac monitoring in place per MD orders. Continuous normal saline infusion and penicillin started this shift. No complaints of pain this shift. Will continue to monitor.

## 2017-12-13 NOTE — ASSESSMENT & PLAN NOTE
-presents in immunocompromised state, with fever, s/p syncope not on ART- CD4 <200  -cont Avelox for CAP coverage  -syncopal event could represent dysautonomia in AIDS vs neurocardiogenic syncopy vs advanced syphilis  vs sepsis.   -continue to follow Derm workup/recs of skin lesion. Follow up fungal assays, micro workup to date   -arrange ID follow up on discharge.

## 2017-12-13 NOTE — PLAN OF CARE
I was notified by primary team that CSF VDRL came back positive, blood RPR positive 1:256; all this is consistent with neurosyphilis which could be the cause of fever; will start patient on Penicillin G IV continuous infusion ; watch for Jarisch-Herxheimer reaction.    Frankie Robertson MD  Infectious Disease Fellow, PGY-5  Pager: [phone]  [facility]

## 2017-12-13 NOTE — ASSESSMENT & PLAN NOTE
-CSF strongly positive titer; initiated aq penicillin G continuous infusion. Watch for Jarisch-Herxheimer reaction with immune response to dying spirochetes. Can mitigate this with prednisolone

## 2017-12-13 NOTE — PROGRESS NOTES
[facility]  Infectious Disease  Progress Note    Patient Name: Herson Chavez  MRN: [id number]  Admission Date: 12/11/2017  Length of Stay: 2 days  Attending Physician: Brock Clement MD  Primary Care Provider: Becky Sanders MD    Isolation Status: No active isolations  Assessment/Plan:      * AIDS (acquired immunodeficiency syndrome), CD4 <=200    -cont Avelox for CAP coverage  -syncopal event could represent dysautonomia in AIDS vs neurocardiogenic syncope vs advanced syphilis  vs sepsis.   -continue to follow Derm workup/recs of skin lesion. Follow up fungal assays/skin bx result   -arrange ID follow up on discharge.         Neurosyphilis    -CSF strongly positive titer; initiated aqueous penicillin G continuous infusion. Watch for Jarisch-Herxheimer reaction with immune response to dying spirochetes.             Anticipated Disposition: inpatient    Thank you for your consult. I will follow-up with patient. Please contact us if you have any additional questions.    Dipak Carlos MD  Infectious Disease  [facility]    Subjective:     Principal Problem:AIDS (acquired immunodeficiency syndrome), CD4 <=200    HPI: Mr Chavez is a 38 yo M with advanced HIV (last CD4 12/11/17 164), syphilis, no longer on ART citing medication costs, presents to the ED on 12/11 after a syncopal episode with +LOC for a few seconds, noticed dizziness/clamminess preceding the event. He is a surgical tech at [employer], and was standing with coworkers preceding the event, denies seizure like activity, has mild confusion upon waking. He has recently noted a lesion on his chin 2 weeks prior, with additional findings on neck, arm, trunk. He denies HA, chest pain, cough, SOB, other pain, n/v/d, penile discharge. He last took his ART in August 2017. ID is consulted for ART noncompliance in a patient with AIDS and new skin manifestations, syncope. LP and CSF studies and full micro workup were  performed in the ED, results pending. He is currently on Avelox for empiric lung coverage of CAP, and Dermatology has been consulted for skin lesions regarding KS. Patient had been taking Levaquin for respiratory infection prior to admission.   Interval History: CSF VDRL +; initiated PCN overnight. Continues to be febrile and tachycardic.     Review of Systems   Constitutional: Positive for activity change and appetite change. Negative for chills, diaphoresis, fatigue and fever.   HENT: Negative for nosebleeds, postnasal drip, sinus pain, sinus pressure and sore throat.    Eyes: Negative for photophobia and visual disturbance.   Respiratory: Negative for cough, chest tightness, shortness of breath and stridor.    Cardiovascular: Negative for chest pain, palpitations and leg swelling.   Gastrointestinal: Negative for abdominal distention, anal bleeding, diarrhea, nausea and vomiting.   Endocrine: Negative for polyphagia and polyuria.   Genitourinary: Negative for difficulty urinating, penile pain, penile swelling and scrotal swelling.   Musculoskeletal: Negative for arthralgias, gait problem and joint swelling.   Skin: Positive for rash.   Allergic/Immunologic: Positive for immunocompromised state.   Neurological: Positive for syncope and weakness. Negative for dizziness, seizures, speech difficulty and headaches.   Hematological: Positive for adenopathy.   Psychiatric/Behavioral: Negative for decreased concentration and dysphoric mood. The patient is not nervous/anxious.      Objective:     Vital Signs (Most Recent):  Temp: (!) 103.1 °F (39.5 °C) (12/13/17 0801)  Pulse: (!) 125 (12/13/17 0801)  Resp: 18 (12/13/17 0801)  BP: 107/66 (12/13/17 0801)  SpO2: 96 % (12/13/17 0801) Vital Signs (24h Range):  Temp:  [98.4 °F (36.9 °C)-103.1 °F (39.5 °C)] 103.1 °F (39.5 °C)  Pulse:  [] 125  Resp:  [16-18] 18  SpO2:  [94 %-98 %] 96 %  BP: (104-123)/(60-75) 107/66     Weight: 76.3 kg (168 lb 3.4 oz)  Body mass index is  24.14 kg/m².    Estimated Creatinine Clearance: 113.8 mL/min (based on SCr of 0.9 mg/dL).    Physical Exam   Constitutional: He is oriented to person, place, and time. He appears well-developed and well-nourished.   HENT:   Head: Normocephalic and atraumatic. Microcephalic:      Eyes: EOM are normal. Pupils are equal, round, and reactive to light. Right eye exhibits no discharge. Left eye exhibits no discharge. No scleral icterus.   Neck: Normal range of motion. Neck supple. No JVD present. Erythema present. No tracheal deviation present. No thyromegaly present.       Cardiovascular: Regular rhythm and normal heart sounds.  Exam reveals no gallop and no friction rub.    No murmur heard.  Pulmonary/Chest: Effort normal and breath sounds normal. No respiratory distress. He has no rales.   Abdominal: Soft. Bowel sounds are normal. He exhibits no distension and no mass. There is no tenderness. There is no rebound and no guarding.   Musculoskeletal: Normal range of motion. He exhibits no edema or deformity.   Lymphadenopathy:     He has no cervical adenopathy.   Neurological: He is alert and oriented to person, place, and time. No cranial nerve deficit. Coordination normal.   Skin: Skin is warm. Capillary refill takes less than 2 seconds. Rash noted. There is erythema.   Multiple raised papular lesions with central umbilication   Psychiatric: He has a normal mood and affect.       Significant Labs:   C4 Count: No results for input(s): C4 in the last 48 hours.  CBC:   Recent Labs  Lab 12/11/17  0944 12/12/17  0058 12/13/17  0452   WBC 4.24 4.05 5.39   HGB 10.4* 8.6* 9.6*   HCT 33.7* 27.3* 30.0*   * 420* 373*     CMP:   Recent Labs  Lab 12/11/17  0944 12/12/17  0821 12/13/17  0452    134* 135*   K 4.0 3.9 4.5    100 100   CO2 23 27 26   * 119* 104   BUN 12 8 10   CREATININE 1.1 0.9 0.9   CALCIUM 9.6 9.0 9.0   PROT 10.6* 9.1* 9.3*   ALBUMIN 2.6* 2.2* 2.2*   BILITOT 0.4 0.4 0.4   ALKPHOS 81 78 78    AST 36 33 31   ALT 16 16 17   ANIONGAP 12 7* 9   EGFRNONAA >60.0 >60.0 >60.0     Urine Studies:   Recent Labs  Lab 12/11/17  1026   COLORU Deepika   APPEARANCEUA Hazy*   PHUR 5.0   SPECGRAV >=1.030*   PROTEINUA 2+*   GLUCUA Negative   KETONESU Negative   BILIRUBINUA Negative   OCCULTUA 1+*   NITRITE Negative   UROBILINOGEN Negative   LEUKOCYTESUR Negative   RBCUA 1   WBCUA 5   BACTERIA Few*   SQUAMEPITHEL 0   HYALINECASTS 7*     All pertinent labs within the past 24 hours have been reviewed.  Microbiology Results (last 7 days)     Procedure Component Value Units Date/Time    Blood culture [[id number]]     Order Status:  Sent Specimen:  Blood     Blood culture [[id number]]     Order Status:  Sent Specimen:  Blood     Blood culture [[id number]] Collected:  12/12/17 0058    Order Status:  Completed Specimen:  Blood Updated:  12/13/17 0613     Blood Culture, Routine No Growth to date     Blood Culture, Routine No Growth to date    Blood culture [[id number]] Collected:  12/12/17 0058    Order Status:  Completed Specimen:  Blood Updated:  12/13/17 0613     Blood Culture, Routine No Growth to date     Blood Culture, Routine No Growth to date    AFB Culture & Smear [[id number]] Collected:  12/12/17 1530    Order Status:  Sent Specimen:  Biopsy from Arm, Left Updated:  12/12/17 1641    Fungal culture , skin, hair, or nails [[id number]] Collected:  12/12/17 1529    Order Status:  Sent Specimen:  Skin, Hair, Nail from Skin Updated:  12/12/17 1640    Aerobic culture [[id number]] Collected:  12/12/17 1530    Order Status:  Sent Specimen:  Biopsy from Arm, Left Updated:  12/12/17 1638    Culture, Anaerobe [[id number]] Collected:  12/12/17 1530    Order Status:  Sent Specimen:  Biopsy from Arm, Left Updated:  12/12/17 1638    Cryptococcal antigen, CSF (Tube 3) [[id number]] Collected:  12/11/17 1346    Order Status:  Completed Specimen:  CSF (Spinal Fluid) from CSF Tap, Tube 2 Updated:  12/12/17 1232     Crypto Ag, CSF Negative     Narrative:       On which sequentially labeled tube should this analysis be  performed?->3    Cryptococcal antigen, CSF [[id number]] Collected:  12/11/17 1350    Order Status:  Completed Specimen:  CSF (Spinal Fluid) Updated:  12/12/17 1230     Crypto Ag, CSF Negative    Narrative:       Add on order [id number] Crypto. Per MD Terrance  12/12/2017  10:32     Cryptococcal antigen [[id number]] Collected:  12/12/17 0958    Order Status:  Sent Specimen:  Blood from Blood Updated:  12/12/17 1008    Cryptococcal antigen, CSF [[id number]]     Order Status:  Completed Specimen:  CSF (Spinal Fluid)     AFB Culture & Smear [[id number]]     Order Status:  Canceled Specimen:  Skin from Arm, Left     Aerobic culture [[id number]]     Order Status:  Canceled Specimen:  Skin from Arm, Left     Culture, Anaerobe [[id number]]     Order Status:  Canceled Specimen:  Skin from Arm, Left     Culture, Respiratory with Gram Stain [[id number]]     Order Status:  No result Specimen:  Respiratory     CSF culture and Gram Stain (Tube 2) [[id number]] Collected:  12/11/17 1346    Order Status:  Completed Specimen:  CSF (Spinal Fluid) from CSF Tap, Tube 2 Updated:  12/12/17 0823     CSF CULTURE No Growth to date     Gram Stain Result No WBC's      No organisms seen    Narrative:       On which sequentially labeled tube should this analysis be  performed?->2    C. trachomatis/N. gonorrhoeae by AMP DNA Urine [[id number]] Collected:  12/11/17 1200    Order Status:  Completed Specimen:  Genital Updated:  12/11/17 1624     Chlamydia, Amplified DNA Not Detected     N gonorrhoeae, amplified DNA Not Detected    Narrative:       received yellow and gray top    Fungus culture (Tube 3) [[id number]] Collected:  12/11/17 1346    Order Status:  Sent Specimen:  CSF (Spinal Fluid) from CSF Tap, Tube 2 Updated:  12/11/17 1412    C. trachomatis/N. gonorrhoeae by AMP DNA [[id number]] Collected:  12/11/17 1026    Order Status:  Canceled Updated:  12/11/17 1105    C.  trachomatis/N. gonorrhoeae by AMP DNA Urine [[id number]]     Order Status:  Completed Specimen:  Genital           Significant Imaging: I have reviewed all pertinent imaging results/findings within the past 24 hours.

## 2017-12-14 LAB
1,3 BETA GLUCAN SPEC-MCNC: <31 PG/ML
ALBUMIN SERPL BCP-MCNC: 2.1 G/DL
ALP SERPL-CCNC: 74 U/L
ALT SERPL W/O P-5'-P-CCNC: 18 U/L
ANION GAP SERPL CALC-SCNC: 5 MMOL/L
AST SERPL-CCNC: 36 U/L
BASOPHILS # BLD AUTO: 0.01 K/UL
BASOPHILS NFR BLD: 0.2 %
BILIRUB SERPL-MCNC: 0.3 MG/DL
BUN SERPL-MCNC: 11 MG/DL
CALCIUM SERPL-MCNC: 8.4 MG/DL
CHLORIDE SERPL-SCNC: 103 MMOL/L
CO2 SERPL-SCNC: 25 MMOL/L
CREAT SERPL-MCNC: 0.8 MG/DL
DIFFERENTIAL METHOD: ABNORMAL
EOSINOPHIL # BLD AUTO: 0 K/UL
EOSINOPHIL NFR BLD: 0 %
ERYTHROCYTE [DISTWIDTH] IN BLOOD BY AUTOMATED COUNT: 14.8 %
EST. GFR  (AFRICAN AMERICAN): >60 ML/MIN/1.73 M^2
EST. GFR  (NON AFRICAN AMERICAN): >60 ML/MIN/1.73 M^2
GLUCOSE SERPL-MCNC: 112 MG/DL
HCT VFR BLD AUTO: 26.8 %
HGB BLD-MCNC: 8.5 G/DL
HISTOPLASMA AG VALUE: 0 NG/ML
HISTOPLASMA ANTIGEN URINE: NEGATIVE
IMM GRANULOCYTES # BLD AUTO: 0.02 K/UL
IMM GRANULOCYTES NFR BLD AUTO: 0.4 %
LYMPHOCYTES # BLD AUTO: 1 K/UL
LYMPHOCYTES NFR BLD: 20.9 %
MCH RBC QN AUTO: 29 PG
MCHC RBC AUTO-ENTMCNC: 31.7 G/DL
MCV RBC AUTO: 92 FL
MONOCYTES # BLD AUTO: 0.7 K/UL
MONOCYTES NFR BLD: 14.1 %
NEUTROPHILS # BLD AUTO: 3.1 K/UL
NEUTROPHILS NFR BLD: 64.4 %
NRBC BLD-RTO: 0 /100 WBC
PLATELET # BLD AUTO: 317 K/UL
PMV BLD AUTO: 9 FL
POTASSIUM SERPL-SCNC: 4.1 MMOL/L
PROT SERPL-MCNC: 8.6 G/DL
RBC # BLD AUTO: 2.93 M/UL
SODIUM SERPL-SCNC: 133 MMOL/L
WBC # BLD AUTO: 4.88 K/UL

## 2017-12-14 PROCEDURE — 25000003 PHARM REV CODE 250: Performed by: HOSPITALIST

## 2017-12-14 PROCEDURE — 80053 COMPREHEN METABOLIC PANEL: CPT

## 2017-12-14 PROCEDURE — 25000003 PHARM REV CODE 250: Performed by: INTERNAL MEDICINE

## 2017-12-14 PROCEDURE — 11000001 HC ACUTE MED/SURG PRIVATE ROOM

## 2017-12-14 PROCEDURE — 99232 SBSQ HOSP IP/OBS MODERATE 35: CPT | Mod: ,,, | Performed by: INTERNAL MEDICINE

## 2017-12-14 PROCEDURE — 99232 SBSQ HOSP IP/OBS MODERATE 35: CPT | Mod: ,,, | Performed by: HOSPITALIST

## 2017-12-14 PROCEDURE — 25000003 PHARM REV CODE 250: Performed by: PHYSICIAN ASSISTANT

## 2017-12-14 PROCEDURE — 63600175 PHARM REV CODE 636 W HCPCS: Performed by: INTERNAL MEDICINE

## 2017-12-14 PROCEDURE — 85025 COMPLETE CBC W/AUTO DIFF WBC: CPT

## 2017-12-14 PROCEDURE — 36415 COLL VENOUS BLD VENIPUNCTURE: CPT

## 2017-12-14 RX ADMIN — ACETAMINOPHEN 650 MG: 325 TABLET ORAL at 09:12

## 2017-12-14 RX ADMIN — MOXIFLOXACIN HYDROCHLORIDE 400 MG: 400 TABLET, FILM COATED ORAL at 08:12

## 2017-12-14 RX ADMIN — SODIUM CHLORIDE: 9 INJECTION, SOLUTION INTRAVENOUS at 04:12

## 2017-12-14 RX ADMIN — ACETAMINOPHEN 650 MG: 325 TABLET ORAL at 04:12

## 2017-12-14 RX ADMIN — DEXTROSE MONOHYDRATE 20 MILLION UNITS: 50 INJECTION, SOLUTION INTRAVENOUS at 10:12

## 2017-12-14 NOTE — PLAN OF CARE
Problem: Patient Care Overview  Goal: Plan of Care Review  Outcome: Ongoing (interventions implemented as appropriate)  Patient AAOx4. See flow sheet for patients vitals. Patient free from falls or injury during shift. Patient denies pain. Patient in bed, bed in lowest position, call light in reach, and personal items in reach. Will continue to monitor.

## 2017-12-14 NOTE — SUBJECTIVE & OBJECTIVE
Interval History:     Review of Systems   Constitutional: Positive for activity change and appetite change. Negative for chills, diaphoresis, fatigue and fever.   HENT: Negative for nosebleeds, postnasal drip, sinus pain, sinus pressure and sore throat.    Eyes: Negative for photophobia and visual disturbance.   Respiratory: Negative for cough, chest tightness, shortness of breath and stridor.    Cardiovascular: Negative for chest pain, palpitations and leg swelling.   Gastrointestinal: Negative for abdominal distention, anal bleeding, diarrhea, nausea and vomiting.   Endocrine: Negative for polyphagia and polyuria.   Genitourinary: Negative for difficulty urinating, penile pain, penile swelling and scrotal swelling.   Musculoskeletal: Negative for arthralgias, gait problem and joint swelling.   Skin: Positive for rash.   Allergic/Immunologic: Positive for immunocompromised state.   Neurological: Positive for syncope and weakness. Negative for dizziness, seizures, speech difficulty and headaches.   Hematological: Positive for adenopathy.   Psychiatric/Behavioral: Negative for decreased concentration and dysphoric mood. The patient is not nervous/anxious.      Objective:     Vital Signs (Most Recent):  Temp: 98.9 °F (37.2 °C) (12/14/17 0530)  Pulse: 106 (12/14/17 0530)  Resp: 16 (12/14/17 0407)  BP: 113/70 (12/14/17 0407)  SpO2: 98 % (12/14/17 0407) Vital Signs (24h Range):  Temp:  [98.4 °F (36.9 °C)-101.6 °F (38.7 °C)] 98.9 °F (37.2 °C)  Pulse:  [] 106  Resp:  [14-18] 16  SpO2:  [95 %-99 %] 98 %  BP: ()/(58-70) 113/70     Weight: 76.3 kg (168 lb 3.4 oz)  Body mass index is 24.14 kg/m².    Estimated Creatinine Clearance: 128 mL/min (based on SCr of 0.8 mg/dL).    Physical Exam   Constitutional: He is oriented to person, place, and time. He appears well-developed and well-nourished.   HENT:   Head: Normocephalic and atraumatic. Microcephalic:      Eyes: EOM are normal. Pupils are equal, round, and reactive  to light. Right eye exhibits no discharge. Left eye exhibits no discharge. No scleral icterus.   Neck: Normal range of motion. Neck supple. No JVD present. Erythema present. No tracheal deviation present. No thyromegaly present.       Cardiovascular: Regular rhythm and normal heart sounds.  Exam reveals no gallop and no friction rub.    No murmur heard.  Pulmonary/Chest: Effort normal and breath sounds normal. No respiratory distress. He has no rales.   Abdominal: Soft. Bowel sounds are normal. He exhibits no distension and no mass. There is no tenderness. There is no rebound and no guarding.   Musculoskeletal: Normal range of motion. He exhibits no edema or deformity.   Lymphadenopathy:     He has no cervical adenopathy.   Neurological: He is alert and oriented to person, place, and time. No cranial nerve deficit. Coordination normal.   Skin: Skin is warm. Capillary refill takes less than 2 seconds. Rash noted. There is erythema.   Multiple raised papular lesions with central umbilication   Psychiatric: He has a normal mood and affect.       Significant Labs:   CBC:   Recent Labs  Lab 12/13/17 0452 12/14/17  0452   WBC 5.39 4.88   HGB 9.6* 8.5*   HCT 30.0* 26.8*   * 317     CMP:   Recent Labs  Lab 12/13/17 0452 12/14/17  0452   * 133*   K 4.5 4.1    103   CO2 26 25    112*   BUN 10 11   CREATININE 0.9 0.8   CALCIUM 9.0 8.4*   PROT 9.3* 8.6*   ALBUMIN 2.2* 2.1*   BILITOT 0.4 0.3   ALKPHOS 78 74   AST 31 36   ALT 17 18   ANIONGAP 9 5*   EGFRNONAA >60.0 >60.0     Coagulation: No results for input(s): PT, INR, APTT in the last 48 hours.  Urine Studies:   Recent Labs  Lab 12/11/17  1026   COLORU Deepika   APPEARANCEUA Hazy*   PHUR 5.0   SPECGRAV >=1.030*   PROTEINUA 2+*   GLUCUA Negative   KETONESU Negative   BILIRUBINUA Negative   OCCULTUA 1+*   NITRITE Negative   UROBILINOGEN Negative   LEUKOCYTESUR Negative   RBCUA 1   WBCUA 5   BACTERIA Few*   SQUAMEPITHEL 0   HYALINECASTS 7*     All  pertinent labs within the past 24 hours have been reviewed.  Microbiology Results (last 7 days)     Procedure Component Value Units Date/Time    CSF culture and Gram Stain (Tube 2) [[id number]] Collected:  12/11/17 1346    Order Status:  Completed Specimen:  CSF (Spinal Fluid) from CSF Tap, Tube 2 Updated:  12/14/17 0726     CSF CULTURE No Growth to date     Gram Stain Result No WBC's      No organisms seen    Narrative:       On which sequentially labeled tube should this analysis be  performed?->2    Blood culture [[id number]] Collected:  12/12/17 0058    Order Status:  Completed Specimen:  Blood Updated:  12/14/17 0612     Blood Culture, Routine No Growth to date     Blood Culture, Routine No Growth to date     Blood Culture, Routine No Growth to date    Blood culture [[id number]] Collected:  12/12/17 0058    Order Status:  Completed Specimen:  Blood Updated:  12/14/17 0612     Blood Culture, Routine No Growth to date     Blood Culture, Routine No Growth to date     Blood Culture, Routine No Growth to date    AFB Culture & Smear [[id number]] Collected:  12/12/17 1530    Order Status:  Completed Specimen:  Biopsy from Arm, Left Updated:  12/13/17 2127     AFB Culture & Smear Culture in progress     AFB CULTURE STAIN No acid fast bacilli seen.    Blood culture [[id number]] Collected:  12/13/17 0845    Order Status:  Completed Specimen:  Blood Updated:  12/13/17 1715     Blood Culture, Routine No Growth to date    Blood culture [[id number]] Collected:  12/13/17 0845    Order Status:  Completed Specimen:  Blood Updated:  12/13/17 1715     Blood Culture, Routine No Growth to date    Cryptococcal antigen [[id number]] Collected:  12/12/17 0958    Order Status:  Completed Specimen:  Blood from Blood Updated:  12/13/17 1605     Cryptococcal Ag, Blood Negative    Aerobic culture [[id number]] Collected:  12/12/17 1530    Order Status:  Completed Specimen:  Biopsy from Arm, Left Updated:  12/13/17 0940     Aerobic Bacterial Culture  No growth    Culture, Anaerobe [[id number]] Collected:  12/12/17 1530    Order Status:  Completed Specimen:  Biopsy from Arm, Left Updated:  12/13/17 0902     Anaerobic Culture Culture in progress    Fungal culture , skin, hair, or nails [[id number]] Collected:  12/12/17 1529    Order Status:  Sent Specimen:  Skin, Hair, Nail from Skin Updated:  12/12/17 1640    Cryptococcal antigen, CSF (Tube 3) [[id number]] Collected:  12/11/17 1346    Order Status:  Completed Specimen:  CSF (Spinal Fluid) from CSF Tap, Tube 2 Updated:  12/12/17 1232     Crypto Ag, CSF Negative    Narrative:       On which sequentially labeled tube should this analysis be  performed?->3    Cryptococcal antigen, CSF [[id number]] Collected:  12/11/17 1350    Order Status:  Completed Specimen:  CSF (Spinal Fluid) Updated:  12/12/17 1230     Crypto Ag, CSF Negative    Narrative:       Add on order [id number] Crypto. Per MD Terrance  12/12/2017  10:32     Cryptococcal antigen, CSF [[id number]]     Order Status:  Completed Specimen:  CSF (Spinal Fluid)     AFB Culture & Smear [[id number]]     Order Status:  Canceled Specimen:  Skin from Arm, Left     Aerobic culture [[id number]]     Order Status:  Canceled Specimen:  Skin from Arm, Left     Culture, Anaerobe [[id number]]     Order Status:  Canceled Specimen:  Skin from Arm, Left     Culture, Respiratory with Gram Stain [[id number]]     Order Status:  No result Specimen:  Respiratory     C. trachomatis/N. gonorrhoeae by AMP DNA Urine [[id number]] Collected:  12/11/17 1200    Order Status:  Completed Specimen:  Genital Updated:  12/11/17 1624     Chlamydia, Amplified DNA Not Detected     N gonorrhoeae, amplified DNA Not Detected    Narrative:       received yellow and gray top    Fungus culture (Tube 3) [[id number]] Collected:  12/11/17 1346    Order Status:  Sent Specimen:  CSF (Spinal Fluid) from CSF Tap, Tube 2 Updated:  12/11/17 1412    C. trachomatis/N. gonorrhoeae by AMP DNA [[id number]] Collected:  12/11/17  1026    Order Status:  Canceled Updated:  12/11/17 1105    C. trachomatis/N. gonorrhoeae by AMP DNA Urine [[id number]]     Order Status:  Completed Specimen:  Genital           Significant Imaging: I have reviewed all pertinent imaging results/findings within the past 24 hours.

## 2017-12-14 NOTE — PLAN OF CARE
Problem: Patient Care Overview  Goal: Plan of Care Review  Outcome: Ongoing (interventions implemented as appropriate)  Patient remained free from falls or injury. All care explained. Tele intact. Questions addressed. Bed in low and locked position. Call light and personal possessions are within reach. Safety maintained. Will continue to monitor.

## 2017-12-14 NOTE — PROGRESS NOTES
[facility]  Infectious Disease  Progress Note    Patient Name: Herson Chavez  MRN: [id number]  Admission Date: 12/11/2017  Length of Stay: 3 days  Attending Physician: Brock Clement MD  Primary Care Provider: Becky Sanders MD    Isolation Status: No active isolations  Assessment/Plan:      * AIDS (acquired immunodeficiency syndrome), CD4 <=200    -cont Avelox for CAP coverage  -continue to follow Derm workup/recs of skin lesion. Follow up fungal assays, micro workup to date   -arrange ID follow up on discharge.         Neurosyphilis    -CSF 1:2 positive titer; initiated aq penicillin G continuous infusion. Watch for Jarisch-Herxheimer reaction with immune response to dying spirochetes            Anticipated Disposition: inpt     Thank you for your consult. I will follow-up with patient. Please contact us if you have any additional questions.    Dipak Carlos MD  Infectious Disease  [facility]    Subjective:     Principal Problem:AIDS (acquired immunodeficiency syndrome), CD4 <=200    HPI: Mr Chavez is a 38 yo M with advanced HIV (last CD4 12/11/17 164), syphilis, no longer on ART citing medication costs, presents to the ED on 12/11 after a syncopal episode with +LOC for a few seconds, noticed dizziness/clamminess preceding the event. 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 He has recently noted a lesion on his chin 2 weeks prior, with additional findings on neck, arm, trunk. He denies HA, chest pain, cough, SOB, other pain, n/v/d, penile discharge. He last took his ART in August 2017. ID is consulted for ART noncompliance in a patient with AIDS and new skin manifestations, syncope. LP and CSF studies and full micro workup were performed in the ED, results pending. He is currently on Avelox for empiric lung coverage of CAP, and Dermatology has been consulted  for skin lesions regarding KS.   Interval History:     Review of Systems   Constitutional: Positive for activity change and appetite change. Negative for chills, diaphoresis, fatigue and fever.   HENT: Negative for nosebleeds, postnasal drip, sinus pain, sinus pressure and sore throat.    Eyes: Negative for photophobia and visual disturbance.   Respiratory: Negative for cough, chest tightness, shortness of breath and stridor.    Cardiovascular: Negative for chest pain, palpitations and leg swelling.   Gastrointestinal: Negative for abdominal distention, anal bleeding, diarrhea, nausea and vomiting.   Endocrine: Negative for polyphagia and polyuria.   Genitourinary: Negative for difficulty urinating, penile pain, penile swelling and scrotal swelling.   Musculoskeletal: Negative for arthralgias, gait problem and joint swelling.   Skin: Positive for rash.   Allergic/Immunologic: Positive for immunocompromised state.   Neurological: Positive for syncope and weakness. Negative for dizziness, seizures, speech difficulty and headaches.   Hematological: Positive for adenopathy.   Psychiatric/Behavioral: Negative for decreased concentration and dysphoric mood. The patient is not nervous/anxious.      Objective:     Vital Signs (Most Recent):  Temp: 98.9 °F (37.2 °C) (12/14/17 0530)  Pulse: 106 (12/14/17 0530)  Resp: 16 (12/14/17 0407)  BP: 113/70 (12/14/17 0407)  SpO2: 98 % (12/14/17 0407) Vital Signs (24h Range):  Temp:  [98.4 °F (36.9 °C)-101.6 °F (38.7 °C)] 98.9 °F (37.2 °C)  Pulse:  [] 106  Resp:  [14-18] 16  SpO2:  [95 %-99 %] 98 %  BP: ()/(58-70) 113/70     Weight: 76.3 kg (168 lb 3.4 oz)  Body mass index is 24.14 kg/m².    Estimated Creatinine Clearance: 128 mL/min (based on SCr of 0.8 mg/dL).    Physical Exam   Constitutional: He is oriented to person, place, and time. He appears well-developed and well-nourished.   HENT:   Head: Normocephalic and atraumatic. Microcephalic:      Eyes: EOM are normal.  Pupils are equal, round, and reactive to light. Right eye exhibits no discharge. Left eye exhibits no discharge. No scleral icterus.   Neck: Normal range of motion. Neck supple. No JVD present. Erythema present. No tracheal deviation present. No thyromegaly present.       Cardiovascular: Regular rhythm and normal heart sounds.  Exam reveals no gallop and no friction rub.    No murmur heard.  Pulmonary/Chest: Effort normal and breath sounds normal. No respiratory distress. He has no rales.   Abdominal: Soft. Bowel sounds are normal. He exhibits no distension and no mass. There is no tenderness. There is no rebound and no guarding.   Musculoskeletal: Normal range of motion. He exhibits no edema or deformity.   Lymphadenopathy:     He has no cervical adenopathy.   Neurological: He is alert and oriented to person, place, and time. No cranial nerve deficit. Coordination normal.   Skin: Skin is warm. Capillary refill takes less than 2 seconds. Rash noted. There is erythema.   Multiple raised papular lesions with central umbilication   Psychiatric: He has a normal mood and affect.       Significant Labs:   CBC:   Recent Labs  Lab 12/13/17 0452 12/14/17 0452   WBC 5.39 4.88   HGB 9.6* 8.5*   HCT 30.0* 26.8*   * 317     CMP:   Recent Labs  Lab 12/13/17 0452 12/14/17 0452   * 133*   K 4.5 4.1    103   CO2 26 25    112*   BUN 10 11   CREATININE 0.9 0.8   CALCIUM 9.0 8.4*   PROT 9.3* 8.6*   ALBUMIN 2.2* 2.1*   BILITOT 0.4 0.3   ALKPHOS 78 74   AST 31 36   ALT 17 18   ANIONGAP 9 5*   EGFRNONAA >60.0 >60.0     Coagulation: No results for input(s): PT, INR, APTT in the last 48 hours.  Urine Studies:   Recent Labs  Lab 12/11/17  1026   COLORU Deepika   APPEARANCEUA Hazy*   PHUR 5.0   SPECGRAV >=1.030*   PROTEINUA 2+*   GLUCUA Negative   KETONESU Negative   BILIRUBINUA Negative   OCCULTUA 1+*   NITRITE Negative   UROBILINOGEN Negative   LEUKOCYTESUR Negative   RBCUA 1   WBCUA 5   BACTERIA Few*    SQUAMEPITHEL 0   HYALINECASTS 7*     All pertinent labs within the past 24 hours have been reviewed.  Microbiology Results (last 7 days)     Procedure Component Value Units Date/Time    CSF culture and Gram Stain (Tube 2) [[id number]] Collected:  12/11/17 1346    Order Status:  Completed Specimen:  CSF (Spinal Fluid) from CSF Tap, Tube 2 Updated:  12/14/17 0726     CSF CULTURE No Growth to date     Gram Stain Result No WBC's      No organisms seen    Narrative:       On which sequentially labeled tube should this analysis be  performed?->2    Blood culture [[id number]] Collected:  12/12/17 0058    Order Status:  Completed Specimen:  Blood Updated:  12/14/17 0612     Blood Culture, Routine No Growth to date     Blood Culture, Routine No Growth to date     Blood Culture, Routine No Growth to date    Blood culture [[id number]] Collected:  12/12/17 0058    Order Status:  Completed Specimen:  Blood Updated:  12/14/17 0612     Blood Culture, Routine No Growth to date     Blood Culture, Routine No Growth to date     Blood Culture, Routine No Growth to date    AFB Culture & Smear [[id number]] Collected:  12/12/17 1530    Order Status:  Completed Specimen:  Biopsy from Arm, Left Updated:  12/13/17 2127     AFB Culture & Smear Culture in progress     AFB CULTURE STAIN No acid fast bacilli seen.    Blood culture [[id number]] Collected:  12/13/17 0845    Order Status:  Completed Specimen:  Blood Updated:  12/13/17 1715     Blood Culture, Routine No Growth to date    Blood culture [[id number]] Collected:  12/13/17 0845    Order Status:  Completed Specimen:  Blood Updated:  12/13/17 1715     Blood Culture, Routine No Growth to date    Cryptococcal antigen [[id number]] Collected:  12/12/17 0958    Order Status:  Completed Specimen:  Blood from Blood Updated:  12/13/17 1605     Cryptococcal Ag, Blood Negative    Aerobic culture [[id number]] Collected:  12/12/17 1530    Order Status:  Completed Specimen:  Biopsy from Arm, Left Updated:   12/13/17 0940     Aerobic Bacterial Culture No growth    Culture, Anaerobe [[id number]] Collected:  12/12/17 1530    Order Status:  Completed Specimen:  Biopsy from Arm, Left Updated:  12/13/17 0902     Anaerobic Culture Culture in progress    Fungal culture , skin, hair, or nails [[id number]] Collected:  12/12/17 1529    Order Status:  Sent Specimen:  Skin, Hair, Nail from Skin Updated:  12/12/17 1640    Cryptococcal antigen, CSF (Tube 3) [[id number]] Collected:  12/11/17 1346    Order Status:  Completed Specimen:  CSF (Spinal Fluid) from CSF Tap, Tube 2 Updated:  12/12/17 1232     Crypto Ag, CSF Negative    Narrative:       On which sequentially labeled tube should this analysis be  performed?->3    Cryptococcal antigen, CSF [[id number]] Collected:  12/11/17 1350    Order Status:  Completed Specimen:  CSF (Spinal Fluid) Updated:  12/12/17 1230     Crypto Ag, CSF Negative    Narrative:       Add on order [id number] Crypto. Per MD Terrance  12/12/2017  10:32     Cryptococcal antigen, CSF [[id number]]     Order Status:  Completed Specimen:  CSF (Spinal Fluid)     AFB Culture & Smear [[id number]]     Order Status:  Canceled Specimen:  Skin from Arm, Left     Aerobic culture [[id number]]     Order Status:  Canceled Specimen:  Skin from Arm, Left     Culture, Anaerobe [[id number]]     Order Status:  Canceled Specimen:  Skin from Arm, Left     Culture, Respiratory with Gram Stain [[id number]]     Order Status:  No result Specimen:  Respiratory     C. trachomatis/N. gonorrhoeae by AMP DNA Urine [[id number]] Collected:  12/11/17 1200    Order Status:  Completed Specimen:  Genital Updated:  12/11/17 1624     Chlamydia, Amplified DNA Not Detected     N gonorrhoeae, amplified DNA Not Detected    Narrative:       received yellow and gray top    Fungus culture (Tube 3) [[id number]] Collected:  12/11/17 1346    Order Status:  Sent Specimen:  CSF (Spinal Fluid) from CSF Tap, Tube 2 Updated:  12/11/17 1412    C. trachomatis/N. gonorrhoeae by  AMP DNA [[id number]] Collected:  12/11/17 1026    Order Status:  Canceled Updated:  12/11/17 1105    C. trachomatis/N. gonorrhoeae by AMP DNA Urine [[id number]]     Order Status:  Completed Specimen:  Genital           Significant Imaging: I have reviewed all pertinent imaging results/findings within the past 24 hours.

## 2017-12-14 NOTE — ASSESSMENT & PLAN NOTE
-cont Avelox for CAP coverage  -continue to follow Derm workup/recs of skin lesion. Follow up fungal assays, micro workup to date   -arrange ID follow up on discharge.

## 2017-12-14 NOTE — PROGRESS NOTES
Progress Note  Hospital Medicine    Admit Date: 12/11/2017  Length of Stay:  LOS: 3 days     SUBJECTIVE:         Follow-up For:  AIDS (acquired immunodeficiency syndrome), CD4 <=200    HPI/Interval history (See H&P for complete P,F,SHx) :   12/12/17  s/p LP- non-infectious cytology with pending serology for VDRL, toxoplasmosis and  HSV.Fever of 103F last night. BC x2 obtained. s/p Dermatology and ID evaluation. s/p punch biopsy x2 for cultures and H&E    12/13/17  Fever with sinus tachycardia. NS Bolus 1L x 2 and continuous hydration    12/14/17  Sinus tachycardia improved. Fever of 101 F this AM, BC from 12/13 - NGTD. aerobic culture from left arm biopsy - staph aureus, Susceptibility pending       Review of Systems: List if applicable  Pain scale: 0 /10  Mild left knee pain    OBJECTIVE:     Vital Signs Range (Last 24H):  Temp:  [98.4 °F (36.9 °C)-101.6 °F (38.7 °C)]   Pulse:  []   Resp:  [14-18]   BP: ()/(58-70)   SpO2:  [95 %-99 %]     Physical Exam:  General- Patient alert and oriented x3   HEENT- PERRLA, EOMI, OP clear  Neck- No JVD, Lymphadenopathy, Thyromegaly  CV- Regular rate and rhythm, No Murmur/carlos/rubs  Resp- Lungs CTA Bilaterally, No increased WOB  Abdomen- Non tender/non-distended, BS normoactive x4 quads, no HSM  Extrem- No cyanosis, clubbing, edema.   Skin- No rashes, lesions, ulcers,  scattered distrubution of  lesions with sparing of palms and soles.   Neuro- Strength 5/5 flexors/extensors,Intact sensation to light touch grossly      Medications:  Medication list was reviewed and changes noted under Assessment/Plan.      Current Facility-Administered Medications:     0.9%  NaCl infusion, , Intravenous, Continuous, Brock Clement MD, Last Rate: 100 mL/hr at 12/14/17 0413    acetaminophen tablet 650 mg, 650 mg, Oral, Q6H PRN, Shimon Elliott PA-C, 650 mg at 12/14/17 0412    hydrocodone-acetaminophen 5-325mg per tablet 1 tablet, 1 tablet, Oral, Q4H PRN, Leo Estevez MD, 1  tablet at 12/13/17 1452    moxifloxacin tablet 400 mg, 400 mg, Oral, Daily, Brock Clement MD, 400 mg at 12/14/17 0842    penicillin G potassium 20 Million Units in dextrose 5 % 500 mL CONTINUOUS INFUSION, 20 Million Units, Intravenous, Q24H, Frankie So MD, Last Rate: 20.8 mL/hr at 12/13/17 2155, 20 Million Units at 12/13/17 2155    promethazine tablet 25 mg, 25 mg, Oral, Q4H PRN, Leo Estevez MD    acetaminophen, hydrocodone-acetaminophen 5-325mg, promethazine    Laboratory/Diagnostic Data:  Reviewed and noted in plan where applicable- Please see chart for full lab data.      Recent Labs  Lab 12/12/17 0058 12/13/17 0452 12/14/17 0452   WBC 4.05 5.39 4.88   HGB 8.6* 9.6* 8.5*   HCT 27.3* 30.0* 26.8*   * 373* 317         Recent Labs  Lab 12/12/17 0821 12/13/17 0452 12/14/17 0452   * 135* 133*   K 3.9 4.5 4.1    100 103   CO2 27 26 25   BUN 8 10 11   CREATININE 0.9 0.9 0.8   * 104 112*   CALCIUM 9.0 9.0 8.4*         Recent Labs  Lab 12/12/17  0821 12/13/17 0452 12/14/17 0452   ALKPHOS 78 78 74   ALT 16 17 18   AST 33 31 36   ALBUMIN 2.2* 2.2* 2.1*   PROT 9.1* 9.3* 8.6*   BILITOT 0.4 0.4 0.3        Microbiology labs for the last week  Microbiology Results (last 7 days)     Procedure Component Value Units Date/Time    CSF culture and Gram Stain (Tube 2) [[id number]] Collected:  12/11/17 1346    Order Status:  Completed Specimen:  CSF (Spinal Fluid) from CSF Tap, Tube 2 Updated:  12/14/17 0726     CSF CULTURE No Growth to date     Gram Stain Result No WBC's      No organisms seen    Narrative:       On which sequentially labeled tube should this analysis be  performed?->2    Blood culture [[id number]] Collected:  12/12/17 0058    Order Status:  Completed Specimen:  Blood Updated:  12/14/17 0612     Blood Culture, Routine No Growth to date     Blood Culture, Routine No Growth to date     Blood Culture, Routine No Growth to date    Blood culture [[id number]] Collected:   12/12/17 0058    Order Status:  Completed Specimen:  Blood Updated:  12/14/17 0612     Blood Culture, Routine No Growth to date     Blood Culture, Routine No Growth to date     Blood Culture, Routine No Growth to date    AFB Culture & Smear [[id number]] Collected:  12/12/17 1530    Order Status:  Completed Specimen:  Biopsy from Arm, Left Updated:  12/13/17 2127     AFB Culture & Smear Culture in progress     AFB CULTURE STAIN No acid fast bacilli seen.    Blood culture [[id number]] Collected:  12/13/17 0845    Order Status:  Completed Specimen:  Blood Updated:  12/13/17 1715     Blood Culture, Routine No Growth to date    Blood culture [[id number]] Collected:  12/13/17 0845    Order Status:  Completed Specimen:  Blood Updated:  12/13/17 1715     Blood Culture, Routine No Growth to date    Cryptococcal antigen [[id number]] Collected:  12/12/17 0958    Order Status:  Completed Specimen:  Blood from Blood Updated:  12/13/17 1605     Cryptococcal Ag, Blood Negative    Aerobic culture [[id number]] Collected:  12/12/17 1530    Order Status:  Completed Specimen:  Biopsy from Arm, Left Updated:  12/13/17 0940     Aerobic Bacterial Culture No growth    Culture, Anaerobe [[id number]] Collected:  12/12/17 1530    Order Status:  Completed Specimen:  Biopsy from Arm, Left Updated:  12/13/17 0902     Anaerobic Culture Culture in progress    Fungal culture , skin, hair, or nails [[id number]] Collected:  12/12/17 1529    Order Status:  Sent Specimen:  Skin, Hair, Nail from Skin Updated:  12/12/17 1640    Cryptococcal antigen, CSF (Tube 3) [[id number]] Collected:  12/11/17 1346    Order Status:  Completed Specimen:  CSF (Spinal Fluid) from CSF Tap, Tube 2 Updated:  12/12/17 1232     Crypto Ag, CSF Negative    Narrative:       On which sequentially labeled tube should this analysis be  performed?->3    Cryptococcal antigen, CSF [[id number]] Collected:  12/11/17 1350    Order Status:  Completed Specimen:  CSF (Spinal Fluid) Updated:   12/12/17 1230     Crypto Ag, CSF Negative    Narrative:       Add on order [id number] Crypto. Per MD Terrance  12/12/2017  10:32     Cryptococcal antigen, CSF [[id number]]     Order Status:  Completed Specimen:  CSF (Spinal Fluid)     AFB Culture & Smear [[id number]]     Order Status:  Canceled Specimen:  Skin from Arm, Left     Aerobic culture [[id number]]     Order Status:  Canceled Specimen:  Skin from Arm, Left     Culture, Anaerobe [[id number]]     Order Status:  Canceled Specimen:  Skin from Arm, Left     Culture, Respiratory with Gram Stain [[id number]]     Order Status:  No result Specimen:  Respiratory     C. trachomatis/N. gonorrhoeae by AMP DNA Urine [[id number]] Collected:  12/11/17 1200    Order Status:  Completed Specimen:  Genital Updated:  12/11/17 1624     Chlamydia, Amplified DNA Not Detected     N gonorrhoeae, amplified DNA Not Detected    Narrative:       received yellow and gray top    Fungus culture (Tube 3) [[id number]] Collected:  12/11/17 1346    Order Status:  Sent Specimen:  CSF (Spinal Fluid) from CSF Tap, Tube 2 Updated:  12/11/17 1412    C. trachomatis/N. gonorrhoeae by AMP DNA [[id number]] Collected:  12/11/17 1026    Order Status:  Canceled Updated:  12/11/17 1105    C. trachomatis/N. gonorrhoeae by AMP DNA Urine [[id number]]     Order Status:  Completed Specimen:  Genital            Imaging Results          X-Ray Chest 1 View (Final result)  Result time 12/12/17 05:44:42    Final result by Keyshawn Barr MD (12/12/17 05:44:42)                 Impression:     No significant intrathoracic abnormality.  No significant detrimental interval change in the appearance of the chest since 10/27/16.      Electronically signed by: Keyshawn Barr MD  Date:     12/12/17  Time:    05:44              Narrative:    Comparison is made to 10/26/17.    Heart size is normal, as is the appearance of the pulmonary vascularity.  Lung zones are clear, and free of significant airspace consolidation or volume loss.  No pleural  "fluid.  No hilar or mediastinal mass lesion.  No pneumothorax.                             CT Head Without Contrast (Final result)  Result time 12/11/17 12:05:59    Final result by Brain Wong DO (12/11/17 12:05:59)                 Impression:     Soft tissue swelling/induration overlying the midline occipital calvarium concerning for subcutaneous hematoma without underlying fracture. Clinical correlation advised      Otherwise unremarkable  noncontrast CT head as detailed above specifically without evidence for acute intracranial hemorrhage. Further evaluation as warrented clinically.      Electronically signed by: BRAIN WONG DO  Date:     12/11/17  Time:    12:05              Narrative:    CT brain without contrast.    Comparison: None     Technique: Multiple 5 mm axial images of the head were obtained without intravenous contrast.    Results: There is slight focal soft tissue swelling and induration overlying the superior midline occipital calvarium without underlying calvarial fracture suggestive for subcutaneous hematoma. No evidence for acute intracranial hemorrhage or sulcal effacement. The ventricles are normal in size and configuration without evidence for hydrocephalus.  There is no midline shift or mass effect. Visualized paranasal sinuses and mastoid air cells are clear..                              Estimated body mass index is 24.14 kg/m² as calculated from the following:    Height as of this encounter: 5' 10" (1.778 m).    Weight as of this encounter: 76.3 kg (168 lb 3.4 oz).    I & O (Last 24H):  No intake or output data in the 24 hours ending 12/14/17 1011    Estimated Creatinine Clearance: 128 mL/min (based on SCr of 0.8 mg/dL).    ASSESSMENT/PLAN:     Active Problems:    Active Hospital Problems    Diagnosis  POA    *Syncope [R55]latent syphilis in 2016,s/p LP 12/11- non-infectious cytology with pending serology for CSF. Echo - Normal left ventricular systolic function (EF 60-65%).  No " wall motion abnormalities    Neurosyphilis -  RPR and VDRL positive. HSV PCR and CSF crypto antigen negative. discussed with ID . Penicillin G IV continuous infusion. toxoplasmosis and  HSV. CSF crypto antigen negative.  CT head - unremarkable  noncontrast scan .  telemetry monitoring. EKG - Normal sinus rhythm.aerobic culture from left arm biopsy - staph aureus, Susceptibility pending      Fever with sinus tachycardia. NS Bolus 1L x 2 and continuous hydration. BC x2 obtained. s/p Dermatology and ID evaluation. U/A negative and CX ray -No significant intrathoracic abnormality. continued moxifloxacin per IDSinus tachycardia improved. Fever of 101 F this AM, BC from 12/13 - NGTD    Yes    Skin lesions [L98.9]Bacillary angiomatosis vs Molluscum vs Kaposi vs syphilis vs disseminated mycobacterial or fungal infection (such as histoplasmosis, cryptococcus, coccidiosis, paracoccidioides, penicilliosis).    s/p punch biopsy x2 for cultures and H&E  Yes    AIDS (acquired immunodeficiency syndrome), CD4 <=200 [B20]AIDS - CD4 count of 164. does not take HIV medication (last use in August 2017) and does not follow-up with infectious disease due to cost issues. does not want  HIV status disclosed with his grandmother and  coworkers  Anemia - 8.6-->9.6. Normocytic.monitor   Yes      Resolved Hospital Problems    Diagnosis Date Resolved POA   No resolved problems to display.         Disposition- Home    DVT prophylaxis addressed with: Early ambulation            Brock Clement MD  Attending Staff Physician  [facility]  pager- [phone]  Spectralzvt - [phone]

## 2017-12-14 NOTE — ASSESSMENT & PLAN NOTE
-CSF 1:2 positive titer; initiated aq penicillin G continuous infusion. Watch for Jarisch-Herxheimer reaction with immune response to dying spirochetes

## 2017-12-15 PROBLEM — R55 SYNCOPE: Status: RESOLVED | Noted: 2017-12-11 | Resolved: 2017-12-15

## 2017-12-15 LAB
ALBUMIN SERPL BCP-MCNC: 2.1 G/DL
ALP SERPL-CCNC: 74 U/L
ALT SERPL W/O P-5'-P-CCNC: 25 U/L
ANION GAP SERPL CALC-SCNC: 8 MMOL/L
AST SERPL-CCNC: 43 U/L
BACTERIA SPEC AEROBE CULT: NORMAL
BASOPHILS # BLD AUTO: 0 K/UL
BASOPHILS NFR BLD: 0 %
BILIRUB SERPL-MCNC: 0.2 MG/DL
BUN SERPL-MCNC: 8 MG/DL
CALCIUM SERPL-MCNC: 8.9 MG/DL
CHLORIDE SERPL-SCNC: 103 MMOL/L
CO2 SERPL-SCNC: 27 MMOL/L
CREAT SERPL-MCNC: 0.8 MG/DL
DIFFERENTIAL METHOD: ABNORMAL
EOSINOPHIL # BLD AUTO: 0 K/UL
EOSINOPHIL NFR BLD: 0.4 %
ERYTHROCYTE [DISTWIDTH] IN BLOOD BY AUTOMATED COUNT: 14.8 %
EST. GFR  (AFRICAN AMERICAN): >60 ML/MIN/1.73 M^2
EST. GFR  (NON AFRICAN AMERICAN): >60 ML/MIN/1.73 M^2
GLUCOSE SERPL-MCNC: 117 MG/DL
HCT VFR BLD AUTO: 28.2 %
HGB BLD-MCNC: 8.8 G/DL
HISTOPLASMA AG INTERP.: NEGATIVE
HISTOPLASMA RESULT: NORMAL NG/ML
IMM GRANULOCYTES # BLD AUTO: 0.02 K/UL
IMM GRANULOCYTES NFR BLD AUTO: 0.7 %
LYMPHOCYTES # BLD AUTO: 0.9 K/UL
LYMPHOCYTES NFR BLD: 34.8 %
MCH RBC QN AUTO: 28.5 PG
MCHC RBC AUTO-ENTMCNC: 31.2 G/DL
MCV RBC AUTO: 91 FL
MONOCYTES # BLD AUTO: 0.3 K/UL
MONOCYTES NFR BLD: 12.2 %
NEUTROPHILS # BLD AUTO: 1.4 K/UL
NEUTROPHILS NFR BLD: 51.9 %
NRBC BLD-RTO: 0 /100 WBC
PLATELET # BLD AUTO: 344 K/UL
PMV BLD AUTO: 8.9 FL
POTASSIUM SERPL-SCNC: 3.9 MMOL/L
PROT SERPL-MCNC: 8.8 G/DL
RBC # BLD AUTO: 3.09 M/UL
SODIUM SERPL-SCNC: 138 MMOL/L
WBC # BLD AUTO: 2.7 K/UL

## 2017-12-15 PROCEDURE — 11000001 HC ACUTE MED/SURG PRIVATE ROOM

## 2017-12-15 PROCEDURE — 25000003 PHARM REV CODE 250: Performed by: HOSPITALIST

## 2017-12-15 PROCEDURE — 80053 COMPREHEN METABOLIC PANEL: CPT

## 2017-12-15 PROCEDURE — 25000003 PHARM REV CODE 250: Performed by: INTERNAL MEDICINE

## 2017-12-15 PROCEDURE — 36415 COLL VENOUS BLD VENIPUNCTURE: CPT

## 2017-12-15 PROCEDURE — 99233 SBSQ HOSP IP/OBS HIGH 50: CPT | Mod: ,,, | Performed by: INTERNAL MEDICINE

## 2017-12-15 PROCEDURE — A4216 STERILE WATER/SALINE, 10 ML: HCPCS | Performed by: HOSPITALIST

## 2017-12-15 PROCEDURE — 87536 HIV-1 QUANT&REVRSE TRNSCRPJ: CPT

## 2017-12-15 PROCEDURE — 87901 NFCT AGT GNTYP ALYS HIV1 REV: CPT

## 2017-12-15 PROCEDURE — C1751 CATH, INF, PER/CENT/MIDLINE: HCPCS

## 2017-12-15 PROCEDURE — 87906 NFCT AGT GNTYP ALYS HIV1: CPT

## 2017-12-15 PROCEDURE — 36569 INSJ PICC 5 YR+ W/O IMAGING: CPT

## 2017-12-15 PROCEDURE — 99232 SBSQ HOSP IP/OBS MODERATE 35: CPT | Mod: ,,, | Performed by: HOSPITALIST

## 2017-12-15 PROCEDURE — 63600175 PHARM REV CODE 636 W HCPCS: Performed by: INTERNAL MEDICINE

## 2017-12-15 PROCEDURE — 85025 COMPLETE CBC W/AUTO DIFF WBC: CPT

## 2017-12-15 PROCEDURE — 02HV33Z INSERTION OF INFUSION DEVICE INTO SUPERIOR VENA CAVA, PERCUTANEOUS APPROACH: ICD-10-PCS | Performed by: HOSPITALIST

## 2017-12-15 PROCEDURE — 76937 US GUIDE VASCULAR ACCESS: CPT

## 2017-12-15 RX ORDER — SODIUM CHLORIDE 0.9 % (FLUSH) 0.9 %
10 SYRINGE (ML) INJECTION EVERY 6 HOURS
Status: DISCONTINUED | OUTPATIENT
Start: 2017-12-15 | End: 2017-12-16 | Stop reason: HOSPADM

## 2017-12-15 RX ORDER — SULFAMETHOXAZOLE AND TRIMETHOPRIM 800; 160 MG/1; MG/1
1 TABLET ORAL DAILY
Start: 2017-12-16 | End: 2017-12-18 | Stop reason: SDUPTHER

## 2017-12-15 RX ORDER — SULFAMETHOXAZOLE AND TRIMETHOPRIM 800; 160 MG/1; MG/1
1 TABLET ORAL DAILY
Status: DISCONTINUED | OUTPATIENT
Start: 2017-12-16 | End: 2017-12-16 | Stop reason: HOSPADM

## 2017-12-15 RX ORDER — MOXIFLOXACIN HYDROCHLORIDE 400 MG/1
400 TABLET ORAL DAILY
Qty: 2 TABLET | Refills: 0 | Status: SHIPPED | OUTPATIENT
Start: 2017-12-16 | End: 2017-12-18

## 2017-12-15 RX ORDER — SODIUM CHLORIDE 0.9 % (FLUSH) 0.9 %
10 SYRINGE (ML) INJECTION
Status: DISCONTINUED | OUTPATIENT
Start: 2017-12-15 | End: 2017-12-16 | Stop reason: HOSPADM

## 2017-12-15 RX ADMIN — MOXIFLOXACIN HYDROCHLORIDE 400 MG: 400 TABLET, FILM COATED ORAL at 08:12

## 2017-12-15 RX ADMIN — SODIUM CHLORIDE 500 ML: 0.9 INJECTION, SOLUTION INTRAVENOUS at 09:12

## 2017-12-15 RX ADMIN — DEXTROSE MONOHYDRATE 20 MILLION UNITS: 50 INJECTION, SOLUTION INTRAVENOUS at 09:12

## 2017-12-15 RX ADMIN — SODIUM CHLORIDE, PRESERVATIVE FREE 10 ML: 5 INJECTION INTRAVENOUS at 06:12

## 2017-12-15 RX ADMIN — SODIUM CHLORIDE: 9 INJECTION, SOLUTION INTRAVENOUS at 03:12

## 2017-12-15 RX ADMIN — HYDROCODONE BITARTRATE AND ACETAMINOPHEN 1 TABLET: 5; 325 TABLET ORAL at 09:12

## 2017-12-15 NOTE — PROCEDURES
"Herson Chavez is a 37 y.o. male patient.    Temp: 97.1 °F (36.2 °C) (12/15/17 1116)  Pulse: 108 (12/15/17 1446)  Resp: 18 (12/15/17 1116)  BP: 120/68 (12/15/17 1116)  SpO2: 99 % (12/15/17 1116)  Weight: 76.3 kg (168 lb 3.4 oz) (12/11/17 1929)  Height: 5' 10" (177.8 cm) (12/11/17 1929)    PICC  Date/Time: 12/15/2017 3:16 PM  Performed by: TORRES GIBSON  Consent Done: Yes  Time out: Immediately prior to procedure a time out was called to verify the correct patient, procedure, equipment, support staff and site/side marked as required  Indications: med administration  Local anesthetic: lidocaine 1% without epinephrine  Anesthetic Total (mL): 2  Preparation: skin prepped with ChloraPrep  Skin prep agent dried: skin prep agent completely dried prior to procedure  Sterile barriers: all five maximum sterile barriers used - cap, mask, sterile gown, sterile gloves, and large sterile sheet  Hand hygiene: hand hygiene performed prior to central venous catheter insertion  Location details: right basilic  Catheter type: double lumen  Catheter size: 5 Fr  Catheter Length: 39cm    Ultrasound guidance: yes  Vessel Caliber: medium and patent, compressibility normal  Needle advanced into vessel with real time Ultrasound guidance.  Guidewire confirmed in vessel.  Image recorded and saved.  Sterile sheath used.  no esophageal manometryNumber of attempts: 1  Post-procedure: blood return through all ports, chlorhexidine patch and sterile dressing applied  Specimens: No  Implants: No  Assessment: placement verified by x-ray  Complications: none        Antonia Garay  12/15/2017    "

## 2017-12-15 NOTE — PLAN OF CARE
[facility]    HOME HEALTH ORDERS  FACE TO FACE ENCOUNTER    Patient Name: Herson Chavez  YOB: 1980    PCP: Becky Sanders MD   PCP Address: [address] / [place] [address]  PCP Phone Number: [phone]  PCP Fax: [phone]    Encounter Date: 12/15/2017    Admit to Home Health    Diagnoses:  Active Hospital Problems    Diagnosis  POA    *AIDS (acquired immunodeficiency syndrome), CD4 <=200 [B20]  Yes    Neurosyphilis [A52.3]  Yes    Skin lesions [L98.9]  Yes     Widespread scattered distrubution of asymptomatic lesions with mostly sparing of palms and soles. Patient with lesion on left palm that states is a cut with dermabond on place. Patient with systemic symptoms of fever, night sweats and weight loss. Ddx is broad in immunocompromised patient and includes the following: Bacillary angiomatosis vs Molluscum vs Kaposi vs syphilis vs disseminated mycobacterial or fungal infection (such as histoplasmosis, cryptococcus, coccidiosis, paracoccidioides, penicilliosis).    -See procedure note below for punch biopsy x2.    -One for tissue culture (AFB, anaerobes, aerobes, and fungal) to r/o infectious etiologies    -One for H&E   -ID following, appreciate recommendations     Punch biopsy x2  performed after verbal consent including risk of infection, scar, recurrence, need for additional treatment of site. Area prepped with alcohol, anesthetized with approximately 1.0cc of 1% lidocaine with epinephrine. Lesional tissue punched with 4 mm punch biopsy. Hemostasis achieved and biopsy site packed with surgical gel. No complications. Dressing applied. Wound care explained. Patient should leave dressing on for 24 hours than may apply Aquaphor dressing to keep wound moist as healing           Syncope [R55]  Yes      Resolved Hospital Problems    Diagnosis Date Resolved POA   No resolved problems to display.       No future appointments.        I have seen and examined this  patient face to face today. My clinical findings that support the need for the home health skilled services and home bound status are the following:  Weakness/numbness causing balance and gait disturbance due to Weakness/Debility making it taxing to leave home.    Allergies:Review of patient's allergies indicates:  No Known Allergies    Diet: regular diet    Activities: activity as tolerated per PT/OT    Nursing:   SN to complete comprehensive assessment including routine vital signs. Instruct on disease process and s/s of complications to report to MD. Review/verify medication list sent home with the patient at time of discharge  and instruct patient/caregiver as needed. Frequency may be adjusted depending on start of care date.    Notify MD if SBP > 160 or < 90; DBP > 90 or < 50; HR > 120 or < 50; Temp > 101;       CONSULTS:    Physical Therapy to evaluate and treat. Evaluate for home safety and equipment needs; Establish/upgrade home exercise program. Perform / instruct on therapeutic exercises, gait training, transfer training, and Range of Motion.  Occupational Therapy to evaluate and treat. Evaluate home environment for safety and equipment needs. Perform/Instruct on transfers, ADL training, ROM, and therapeutic exercises.  Aide to provide assistance with personal care, ADLs, and vital signs.    MISCELLANEOUS CARE:    Home Infusion Therapy:   SN to perform Infusion Therapy/Central Line Care.  Review Central Line Care & Central Line Flush with patient.    Administer (drug and dose):  dextrose 5 % SolP 500 mL with penicillin G potassium 5 million unit SolR 20 Million Units  Inject 20 Million Units into the vein continuous.      Scrub the Hub: Prior to accessing the line, always perform a 30 second alcohol scrub  Each lumen of the central line is to be flushed at least daily with 10 mL Normal Saline and 3 mL Heparin flush (100 units/mL)  Skilled Nurse (SN) may draw blood from IV access  Blood Draw Procedure:   -  Aspirate at least 5 mL of blood   - Discard   - Obtain specimen   - Change posiflow cap   - Flush with 20 mL Normal Saline followed by a                 3-5 mL Heparin flush (100 units/mL)  Central :   - Sterile dressing changes are done weekly and as needed.   - Use chlor-hexadine scrub to cleanse site, apply Biopatch to insertion site,       apply securement device dressing   - Posi-flow caps are changed weekly and after EVERY lab draw.   - If sterile gauze is under dressing to control oozing,                 dressing change must be performed every 24 hours until gauze is not needed.    WOUND CARE ORDERS  n/a      Medications: Review discharge medications with patient and family and provide education.         Herson Chavez   Home Medication Instructions COSTA:[id number]    Printed on:12/15/17 1342   Medication Information                      dextrose 5 % SolP 500 mL with penicillin G potassium 5 million unit SolR 20 Million Units  Inject 20 Million Units into the vein continuous.  (End date 12/26/17)           moxifloxacin (AVELOX) 400 mg tablet  Take 1 tablet (400 mg total) by mouth once daily.  (end date 12/17/17)           sulfamethoxazole-trimethoprim 800-160mg (BACTRIM DS) 800-160 mg Tab  Take 1 tablet by mouth once daily.               I certify that this patient is confined to his home and needs intermittent skilled nursing care, physical therapy and occupational therapy.

## 2017-12-15 NOTE — PROGRESS NOTES
[facility]  Infectious Disease  Progress Note    Patient Name: Herson Chavez  MRN: [id number]  Admission Date: 12/11/2017  Length of Stay: 4 days  Attending Physician: Brock Clement MD  Primary Care Provider: Becky Sanders MD    Isolation Status: No active isolations  Assessment/Plan:      * AIDS (acquired immunodeficiency syndrome), CD4 <=200    -cont Avelox for CAP coverage x5 day course  -continue to follow Derm workup/recs of skin lesion. Follow up fungal assays, micro workup to date unrevealing aside from CSF VDRL+   -arrange ID follow up on discharge. Discharge on Bactrim DS once daily for PJP ppx.  -recommend Social work consult and HIV genotyping labs for discharge.         Neurosyphilis    -continue aq penicillin G continuous infusion with PICC for discharge to make a 14 day course total            Anticipated Disposition: inpt     Thank you for your consult. I will follow-up with patient. Please contact us if you have any additional questions.    Dipak Carlos MD  Infectious Disease  [facility]    Subjective:     Principal Problem:AIDS (acquired immunodeficiency syndrome), CD4 <=200    HPI: Mr Chavez is a 38 yo M with advanced HIV (last CD4 12/11/17 164), syphilis, no longer on ART citing medication costs, presents to the ED on 12/11 after a syncopal episode with +LOC for a few seconds, noticed dizziness/clamminess preceding the event. He is a surgical tech at [employer], and was standing with coworkers preceding the event, denies seizure like activity, has mild confusion upon waking. He has recently noted a lesion on his chin 2 weeks prior, with additional findings on neck, arm, trunk. He denies HA, chest pain, cough, SOB, other pain, n/v/d, penile discharge. He last took his ART in August 2017. ID is consulted for ART noncompliance in a patient with AIDS and new skin manifestations, syncope. LP and CSF studies and full micro workup were performed in the  ED, results pending. He is currently on Avelox for empiric lung coverage of CAP, and Dermatology has been consulted for skin lesions, biopsies pending.   Interval History: Tmax 100.6F; no acute events reported. Fungal assays negative, skin biopsy showing S. Aureus likely contaminant.     Review of Systems   Constitutional: Negative for activity change, appetite change, chills, diaphoresis, fatigue and fever.   HENT: Negative for nosebleeds, postnasal drip, sinus pain, sinus pressure and sore throat.    Eyes: Negative for photophobia and visual disturbance.   Respiratory: Negative for cough, chest tightness, shortness of breath and stridor.    Cardiovascular: Negative for chest pain, palpitations and leg swelling.   Gastrointestinal: Negative for abdominal distention, anal bleeding, diarrhea, nausea and vomiting.   Endocrine: Negative for polyphagia and polyuria.   Genitourinary: Negative for difficulty urinating, penile pain, penile swelling and scrotal swelling.   Musculoskeletal: Negative for arthralgias, gait problem and joint swelling.   Skin: Positive for rash.   Allergic/Immunologic: Positive for immunocompromised state.   Neurological: Positive for weakness. Negative for dizziness, seizures, syncope, speech difficulty and headaches.   Hematological: Positive for adenopathy.   Psychiatric/Behavioral: Negative for decreased concentration and dysphoric mood. The patient is not nervous/anxious.      Objective:     Vital Signs (Most Recent):  Temp: 97.4 °F (36.3 °C) (12/15/17 0758)  Pulse: 110 (12/15/17 0758)  Resp: 16 (12/15/17 0758)  BP: 137/74 (12/15/17 0758)  SpO2: 99 % (12/15/17 0758) Vital Signs (24h Range):  Temp:  [97.4 °F (36.3 °C)-100.6 °F (38.1 °C)] 97.4 °F (36.3 °C)  Pulse:  [] 110  Resp:  [16-20] 16  SpO2:  [96 %-100 %] 99 %  BP: ()/(52-74) 137/74     Weight: 76.3 kg (168 lb 3.4 oz)  Body mass index is 24.14 kg/m².    Estimated Creatinine Clearance: 128 mL/min (based on SCr of 0.8  mg/dL).    Physical Exam   Constitutional: He is oriented to person, place, and time. He appears well-developed and well-nourished.   HENT:   Head: Normocephalic and atraumatic. Microcephalic:      Eyes: EOM are normal. Pupils are equal, round, and reactive to light. Right eye exhibits no discharge. Left eye exhibits no discharge. No scleral icterus.   Neck: Normal range of motion. Neck supple. No JVD present. Erythema present. No tracheal deviation present. No thyromegaly present.       Cardiovascular: Regular rhythm and normal heart sounds.  Exam reveals no gallop and no friction rub.    No murmur heard.  Pulmonary/Chest: Effort normal and breath sounds normal. No respiratory distress. He has no rales.   Abdominal: Soft. Bowel sounds are normal. He exhibits no distension and no mass. There is no tenderness. There is no rebound and no guarding.   Musculoskeletal: Normal range of motion. He exhibits no edema or deformity.   Lymphadenopathy:     He has no cervical adenopathy.   Neurological: He is alert and oriented to person, place, and time. No cranial nerve deficit. Coordination normal.   Skin: Skin is warm. Capillary refill takes less than 2 seconds. Rash noted. There is erythema.   Multiple raised papular lesions with central umbilication, now with bleeding/crusting  Psychiatric: He has a normal mood and affect.       Significant Labs:   C4 Count: No results for input(s): C4 in the last 48 hours.  CBC:   Recent Labs  Lab 12/14/17  0452 12/15/17  0344   WBC 4.88 2.70*   HGB 8.5* 8.8*   HCT 26.8* 28.2*    344     CMP:   Recent Labs  Lab 12/14/17  0452 12/15/17  0344   * 138   K 4.1 3.9    103   CO2 25 27   * 117*   BUN 11 8   CREATININE 0.8 0.8   CALCIUM 8.4* 8.9   PROT 8.6* 8.8*   ALBUMIN 2.1* 2.1*   BILITOT 0.3 0.2   ALKPHOS 74 74   AST 36 43*   ALT 18 25   ANIONGAP 5* 8   EGFRNONAA >60.0 >60.0     Coagulation: No results for input(s): PT, INR, APTT in the last 48 hours.  Urine Studies:    Recent Labs  Lab 12/11/17  1026   COLORU Deepika   APPEARANCEUA Hazy*   PHUR 5.0   SPECGRAV >=1.030*   PROTEINUA 2+*   GLUCUA Negative   KETONESU Negative   BILIRUBINUA Negative   OCCULTUA 1+*   NITRITE Negative   UROBILINOGEN Negative   LEUKOCYTESUR Negative   RBCUA 1   WBCUA 5   BACTERIA Few*   SQUAMEPITHEL 0   HYALINECASTS 7*     All pertinent labs within the past 24 hours have been reviewed.  Microbiology Results (last 7 days)     Procedure Component Value Units Date/Time    CSF culture and Gram Stain (Tube 2) [[id number]] Collected:  12/11/17 1346    Order Status:  Completed Specimen:  CSF (Spinal Fluid) from CSF Tap, Tube 2 Updated:  12/15/17 0713     CSF CULTURE No Growth to date     Gram Stain Result No WBC's      No organisms seen    Narrative:       On which sequentially labeled tube should this analysis be  performed?->2    Blood culture [[id number]] Collected:  12/12/17 0058    Order Status:  Completed Specimen:  Blood Updated:  12/15/17 0612     Blood Culture, Routine No Growth to date     Blood Culture, Routine No Growth to date     Blood Culture, Routine No Growth to date     Blood Culture, Routine No Growth to date    Blood culture [[id number]] Collected:  12/12/17 0058    Order Status:  Completed Specimen:  Blood Updated:  12/15/17 0612     Blood Culture, Routine No Growth to date     Blood Culture, Routine No Growth to date     Blood Culture, Routine No Growth to date     Blood Culture, Routine No Growth to date    Culture, Anaerobe [[id number]] Collected:  12/12/17 1530    Order Status:  Completed Specimen:  Biopsy from Arm, Left Updated:  12/14/17 1412     Anaerobic Culture Culture in progress    Fungal culture , skin, hair, or nails [[id number]] Collected:  12/12/17 1529    Order Status:  Completed Specimen:  Skin, Hair, Nail from Skin Updated:  12/14/17 1403     Fungus Cult, skin, hair or nails Culture in progress    Fungus culture (Tube 3) [[id number]] Collected:  12/11/17 1346    Order Status:   Completed Specimen:  CSF (Spinal Fluid) from CSF Tap, Tube 2 Updated:  12/14/17 1403     Fungus (Mycology) Culture Culture in progress    Aerobic culture [[id number]] Collected:  12/12/17 1530    Order Status:  Completed Specimen:  Biopsy from Arm, Left Updated:  12/14/17 1133     Aerobic Bacterial Culture --     STAPHYLOCOCCUS AUREUS  Few  Susceptibility pending      Blood culture [[id number]] Collected:  12/13/17 0845    Order Status:  Completed Specimen:  Blood Updated:  12/14/17 1022     Blood Culture, Routine No Growth to date     Blood Culture, Routine No Growth to date    Blood culture [[id number]] Collected:  12/13/17 0845    Order Status:  Completed Specimen:  Blood Updated:  12/14/17 1022     Blood Culture, Routine No Growth to date     Blood Culture, Routine No Growth to date    AFB Culture & Smear [[id number]] Collected:  12/12/17 1530    Order Status:  Completed Specimen:  Biopsy from Arm, Left Updated:  12/13/17 2127     AFB Culture & Smear Culture in progress     AFB CULTURE STAIN No acid fast bacilli seen.    Cryptococcal antigen [[id number]] Collected:  12/12/17 0958    Order Status:  Completed Specimen:  Blood from Blood Updated:  12/13/17 1605     Cryptococcal Ag, Blood Negative    Cryptococcal antigen, CSF (Tube 3) [[id number]] Collected:  12/11/17 1346    Order Status:  Completed Specimen:  CSF (Spinal Fluid) from CSF Tap, Tube 2 Updated:  12/12/17 1232     Crypto Ag, CSF Negative    Narrative:       On which sequentially labeled tube should this analysis be  performed?->3    Cryptococcal antigen, CSF [[id number]] Collected:  12/11/17 1350    Order Status:  Completed Specimen:  CSF (Spinal Fluid) Updated:  12/12/17 1230     Crypto Ag, CSF Negative    Narrative:       Add on order [id number] Crypto. Per MD Terrance  12/12/2017  10:32     Cryptococcal antigen, CSF [[id number]]     Order Status:  Completed Specimen:  CSF (Spinal Fluid)     AFB Culture & Smear [[id number]]     Order Status:  Canceled  Specimen:  Skin from Arm, Left     Aerobic culture [[id number]]     Order Status:  Canceled Specimen:  Skin from Arm, Left     Culture, Anaerobe [[id number]]     Order Status:  Canceled Specimen:  Skin from Arm, Left     Culture, Respiratory with Gram Stain [[id number]]     Order Status:  No result Specimen:  Respiratory     C. trachomatis/N. gonorrhoeae by AMP DNA Urine [[id number]] Collected:  12/11/17 1200    Order Status:  Completed Specimen:  Genital Updated:  12/11/17 1624     Chlamydia, Amplified DNA Not Detected     N gonorrhoeae, amplified DNA Not Detected    Narrative:       received yellow and gray top    C. trachomatis/N. gonorrhoeae by AMP DNA [[id number]] Collected:  12/11/17 1026    Order Status:  Canceled Updated:  12/11/17 1105    C. trachomatis/N. gonorrhoeae by AMP DNA Urine [[id number]]     Order Status:  Completed Specimen:  Genital           Significant Imaging: I have reviewed all pertinent imaging results/findings within the past 24 hours.

## 2017-12-15 NOTE — PLAN OF CARE
Discharge planning: Discussed plan for home infusion with patient. Referral sent to [facility] and [facility]. Plan is for discharge tomorrow. Discussed follow up with ID and he requests to follow up with Dr. Cannon.  Patient lives with his grandmother and he states that she is aware of his HIV status.    15:00 Notified that [facility] unable to take patient due to staffing. [facility].  [facility] declined. Referral to [facility].  15:35 Accepted by [facility]

## 2017-12-15 NOTE — PLAN OF CARE
Problem: Patient Care Overview  Goal: Plan of Care Review  Outcome: Ongoing (interventions implemented as appropriate)  Went over plan of care at beginning of shift: frequent rounds, frequent v/s, maintaining IV site and IV fluids, quiet environment for rest and sleep, meds, tele monitoring and possible labs in the am. Verbalizes understanding.     Above was completed without incident. Pt had a slight temp which was covered by Tylenol. Other than that, pt rested well and was able to get his IV antibiotics and IV fluids as ordered. Tele monitoring showed ST/NSR.

## 2017-12-15 NOTE — SUBJECTIVE & OBJECTIVE
Interval History: Tmax 100.6F; no acute events reported. Fungal assays negative, skin biopsy showing S. Aureus likely contaminant.     Review of Systems   Constitutional: Negative for activity change, appetite change, chills, diaphoresis, fatigue and fever.   HENT: Negative for nosebleeds, postnasal drip, sinus pain, sinus pressure and sore throat.    Eyes: Negative for photophobia and visual disturbance.   Respiratory: Negative for cough, chest tightness, shortness of breath and stridor.    Cardiovascular: Negative for chest pain, palpitations and leg swelling.   Gastrointestinal: Negative for abdominal distention, anal bleeding, diarrhea, nausea and vomiting.   Endocrine: Negative for polyphagia and polyuria.   Genitourinary: Negative for difficulty urinating, penile pain, penile swelling and scrotal swelling.   Musculoskeletal: Negative for arthralgias, gait problem and joint swelling.   Skin: Positive for rash.   Allergic/Immunologic: Positive for immunocompromised state.   Neurological: Positive for weakness. Negative for dizziness, seizures, syncope, speech difficulty and headaches.   Hematological: Positive for adenopathy.   Psychiatric/Behavioral: Negative for decreased concentration and dysphoric mood. The patient is not nervous/anxious.      Objective:     Vital Signs (Most Recent):  Temp: 97.4 °F (36.3 °C) (12/15/17 0758)  Pulse: 110 (12/15/17 0758)  Resp: 16 (12/15/17 0758)  BP: 137/74 (12/15/17 0758)  SpO2: 99 % (12/15/17 0758) Vital Signs (24h Range):  Temp:  [97.4 °F (36.3 °C)-100.6 °F (38.1 °C)] 97.4 °F (36.3 °C)  Pulse:  [] 110  Resp:  [16-20] 16  SpO2:  [96 %-100 %] 99 %  BP: ()/(52-74) 137/74     Weight: 76.3 kg (168 lb 3.4 oz)  Body mass index is 24.14 kg/m².    Estimated Creatinine Clearance: 128 mL/min (based on SCr of 0.8 mg/dL).    Physical Exam   Constitutional: He is oriented to person, place, and time. He appears well-developed and well-nourished.   HENT:   Head: Normocephalic  and atraumatic. Microcephalic:      Eyes: EOM are normal. Pupils are equal, round, and reactive to light. Right eye exhibits no discharge. Left eye exhibits no discharge. No scleral icterus.   Neck: Normal range of motion. Neck supple. No JVD present. Erythema present. No tracheal deviation present. No thyromegaly present.       Cardiovascular: Regular rhythm and normal heart sounds.  Exam reveals no gallop and no friction rub.    No murmur heard.  Pulmonary/Chest: Effort normal and breath sounds normal. No respiratory distress. He has no rales.   Abdominal: Soft. Bowel sounds are normal. He exhibits no distension and no mass. There is no tenderness. There is no rebound and no guarding.   Musculoskeletal: Normal range of motion. He exhibits no edema or deformity.   Lymphadenopathy:     He has no cervical adenopathy.   Neurological: He is alert and oriented to person, place, and time. No cranial nerve deficit. Coordination normal.   Skin: Skin is warm. Capillary refill takes less than 2 seconds. Rash noted. There is erythema.   Multiple raised papular lesions with central umbilication   Psychiatric: He has a normal mood and affect.       Significant Labs:   C4 Count: No results for input(s): C4 in the last 48 hours.  CBC:   Recent Labs  Lab 12/14/17  0452 12/15/17  0344   WBC 4.88 2.70*   HGB 8.5* 8.8*   HCT 26.8* 28.2*    344     CMP:   Recent Labs  Lab 12/14/17  0452 12/15/17  0344   * 138   K 4.1 3.9    103   CO2 25 27   * 117*   BUN 11 8   CREATININE 0.8 0.8   CALCIUM 8.4* 8.9   PROT 8.6* 8.8*   ALBUMIN 2.1* 2.1*   BILITOT 0.3 0.2   ALKPHOS 74 74   AST 36 43*   ALT 18 25   ANIONGAP 5* 8   EGFRNONAA >60.0 >60.0     Coagulation: No results for input(s): PT, INR, APTT in the last 48 hours.  Urine Studies:   Recent Labs  Lab 12/11/17  1026   COLORU Deepika   APPEARANCEUA Hazy*   PHUR 5.0   SPECGRAV >=1.030*   PROTEINUA 2+*   GLUCUA Negative   KETONESU Negative   BILIRUBINUA Negative   OCCULTUA  1+*   NITRITE Negative   UROBILINOGEN Negative   LEUKOCYTESUR Negative   RBCUA 1   WBCUA 5   BACTERIA Few*   SQUAMEPITHEL 0   HYALINECASTS 7*     All pertinent labs within the past 24 hours have been reviewed.  Microbiology Results (last 7 days)     Procedure Component Value Units Date/Time    CSF culture and Gram Stain (Tube 2) [[id number]] Collected:  12/11/17 1346    Order Status:  Completed Specimen:  CSF (Spinal Fluid) from CSF Tap, Tube 2 Updated:  12/15/17 0713     CSF CULTURE No Growth to date     Gram Stain Result No WBC's      No organisms seen    Narrative:       On which sequentially labeled tube should this analysis be  performed?->2    Blood culture [[id number]] Collected:  12/12/17 0058    Order Status:  Completed Specimen:  Blood Updated:  12/15/17 0612     Blood Culture, Routine No Growth to date     Blood Culture, Routine No Growth to date     Blood Culture, Routine No Growth to date     Blood Culture, Routine No Growth to date    Blood culture [[id number]] Collected:  12/12/17 0058    Order Status:  Completed Specimen:  Blood Updated:  12/15/17 0612     Blood Culture, Routine No Growth to date     Blood Culture, Routine No Growth to date     Blood Culture, Routine No Growth to date     Blood Culture, Routine No Growth to date    Culture, Anaerobe [[id number]] Collected:  12/12/17 1530    Order Status:  Completed Specimen:  Biopsy from Arm, Left Updated:  12/14/17 1412     Anaerobic Culture Culture in progress    Fungal culture , skin, hair, or nails [[id number]] Collected:  12/12/17 1529    Order Status:  Completed Specimen:  Skin, Hair, Nail from Skin Updated:  12/14/17 1403     Fungus Cult, skin, hair or nails Culture in progress    Fungus culture (Tube 3) [[id number]] Collected:  12/11/17 1346    Order Status:  Completed Specimen:  CSF (Spinal Fluid) from CSF Tap, Tube 2 Updated:  12/14/17 1403     Fungus (Mycology) Culture Culture in progress    Aerobic culture [[id number]] Collected:  12/12/17 1530     Order Status:  Completed Specimen:  Biopsy from Arm, Left Updated:  12/14/17 1133     Aerobic Bacterial Culture --     STAPHYLOCOCCUS AUREUS  Few  Susceptibility pending      Blood culture [[id number]] Collected:  12/13/17 0845    Order Status:  Completed Specimen:  Blood Updated:  12/14/17 1022     Blood Culture, Routine No Growth to date     Blood Culture, Routine No Growth to date    Blood culture [[id number]] Collected:  12/13/17 0845    Order Status:  Completed Specimen:  Blood Updated:  12/14/17 1022     Blood Culture, Routine No Growth to date     Blood Culture, Routine No Growth to date    AFB Culture & Smear [[id number]] Collected:  12/12/17 1530    Order Status:  Completed Specimen:  Biopsy from Arm, Left Updated:  12/13/17 2127     AFB Culture & Smear Culture in progress     AFB CULTURE STAIN No acid fast bacilli seen.    Cryptococcal antigen [[id number]] Collected:  12/12/17 0958    Order Status:  Completed Specimen:  Blood from Blood Updated:  12/13/17 1605     Cryptococcal Ag, Blood Negative    Cryptococcal antigen, CSF (Tube 3) [[id number]] Collected:  12/11/17 1346    Order Status:  Completed Specimen:  CSF (Spinal Fluid) from CSF Tap, Tube 2 Updated:  12/12/17 1232     Crypto Ag, CSF Negative    Narrative:       On which sequentially labeled tube should this analysis be  performed?->3    Cryptococcal antigen, CSF [[id number]] Collected:  12/11/17 1350    Order Status:  Completed Specimen:  CSF (Spinal Fluid) Updated:  12/12/17 1230     Crypto Ag, CSF Negative    Narrative:       Add on order [id number] Crypto. Per MD Terrance  12/12/2017  10:32     Cryptococcal antigen, CSF [[id number]]     Order Status:  Completed Specimen:  CSF (Spinal Fluid)     AFB Culture & Smear [[id number]]     Order Status:  Canceled Specimen:  Skin from Arm, Left     Aerobic culture [[id number]]     Order Status:  Canceled Specimen:  Skin from Arm, Left     Culture, Anaerobe [[id number]]     Order Status:  Canceled Specimen:  Skin  from Arm, Left     Culture, Respiratory with Gram Stain [[id number]]     Order Status:  No result Specimen:  Respiratory     C. trachomatis/N. gonorrhoeae by AMP DNA Urine [[id number]] Collected:  12/11/17 1200    Order Status:  Completed Specimen:  Genital Updated:  12/11/17 1624     Chlamydia, Amplified DNA Not Detected     N gonorrhoeae, amplified DNA Not Detected    Narrative:       received yellow and gray top    C. trachomatis/N. gonorrhoeae by AMP DNA [[id number]] Collected:  12/11/17 1026    Order Status:  Canceled Updated:  12/11/17 1105    C. trachomatis/N. gonorrhoeae by AMP DNA Urine [[id number]]     Order Status:  Completed Specimen:  Genital           Significant Imaging: I have reviewed all pertinent imaging results/findings within the past 24 hours.

## 2017-12-15 NOTE — PLAN OF CARE
Discharge planning: HH orders sent to [facility] via [employer]. Called Jeison with [facility] to inform.

## 2017-12-15 NOTE — ASSESSMENT & PLAN NOTE
Widespread asymptomatic eruptions in immunocompromised patient with neurosyphilis and systemic symptoms of fever, night sweats and weight loss. Ddx remains the same until final results from biopsies results and includes  Bacillary angiomatosis vs Molluscum vs Kaposi vs syphilis vs disseminated mycobacterial or fungal infection (such as histoplasmosis, cryptococcus, coccidiosis, paracoccidioides, penicilliosis)     -Still pending final results from punch biopsy x2.   -Would consider increasing his prophylactic dose of bactrim DS to BID for 7-10 days as tissue culture + for Staph aureus (although this could be a contaminant)   -ID following, appreciate recommendations   -Close FU with dermatology; Please inform patient to call [phone] to schedule an appt. Would like to see next week if able

## 2017-12-15 NOTE — PLAN OF CARE
12/11/17 1123   Discharge Assessment   Assessment Type Discharge Planning Assessment   Confirmed/corrected address and phone number on facesheet? Yes   Assessment information obtained from? Patient   Expected Length of Stay (days) 3   Communicated expected length of stay with patient/caregiver yes   Prior to hospitilization cognitive status: Alert/Oriented   Prior to hospitalization functional status: Independent   Current cognitive status: Alert/Oriented   Current Functional Status: Independent   Lives With grandparent(s)   Able to Return to Prior Arrangements yes   Patient's perception of discharge disposition home or selfcare   Readmission Within The Last 30 Days no previous admission in last 30 days   Patient currently being followed by outpatient case management? Yes   Equipment Currently Used at Home none   Discharge Plan A Home with family   Discharge Plan B Home Health   Patient/Family In Agreement With Plan yes

## 2017-12-15 NOTE — ASSESSMENT & PLAN NOTE
-cont [employer] for CAP coverage  -continue to follow Derm workup/recs of skin lesion. Follow up fungal assays, micro workup to date unrevealing aside from CSF VDRL+   -arrange ID follow up on discharge.

## 2017-12-15 NOTE — CONSULTS
Double lumen PICC placed in right basilic vein, 39 cm in length, 0cm exposed with arm circumference 29 cm. Lot# [id number]

## 2017-12-15 NOTE — CONSULTS
"[facility]  Dermatology  Consult Note    Patient Name: Herson Chavez  MRN: [id number]  Admission Date: 12/11/2017  Hospital Length of Stay: 4 days  Attending Physician: Brock Clement MD  Primary Care Provider: Becky Sanders MD     Consults  Subjective:     Principal Problem:AIDS (acquired immunodeficiency syndrome), CD4 <=200    HPI:  This is a 36 YO F with past medical history of AIDs (CD4 count 164) noncompliant on medication (last taken [facility] August 2017 2/2 to cost), Hepatitis C, and latent syphilis admitted for LOC with bowel incontinence and admitted for further workup. Negative head CT and LP was performed to rule out infectious processes as he is immunocompromised.  LP with non-infectious cytology and is pending serology for VDRL, toxoplasmosis and  HSV.  Of note patient had latent syphilis in 2016 with LP 11/2016 with negative VDRL in CSF.     Patient states about 3 weeks ago started getting lesions popping out all over body. Asymptomatic and denies any itching or tenderness associated with these except large lip lesion. Start out as small papules that progress to large scaly plaques. Largest one on left lower lip that he keeps band aide on to keep from picking at. Also with one in between anus and scrotum. Denies that they bleed or ulcerate. Denies that any have resolved and just seems to crop up with new small ones with larger ones progressing. Denies any recent travel or contact with cats.  +15 pound weight loss in past month with night sweats. States has a had dry cough since he was sick with "cold" about 2 weeks ago (skin lesions predate this). CXR negative for acute process. Fevers up to since admission 101.2 and blood cultures taken. ID has been consulted.           No new subjective & objective note has been filed under this hospital service since the last note was generated.    Assessment/Plan:     Skin lesions    Widespread asymptomatic eruptions in immunocompromised " patient with neurosyphilis and systemic symptoms of fever, night sweats and weight loss. Ddx remains the same until final results from biopsies results and includes  Bacillary angiomatosis vs Molluscum vs Kaposi vs syphilis vs disseminated mycobacterial or fungal infection (such as histoplasmosis, cryptococcus, coccidiosis, paracoccidioides, penicilliosis)     -Still pending final results from punch biopsy x2.   -Would consider increasing his prophylactic dose of bactrim DS to BID for 7-10 days as tissue culture + for Staph aureus (although this could be a contaminant)   -ID following, appreciate recommendations   -Close FU with dermatology; Please inform patient to call [phone] to schedule an appt. Would like to see next week if able               Please call for further questions.     Sandrita Wade MD  Dermatology  [facility]

## 2017-12-15 NOTE — PROGRESS NOTES
Progress Note  Hospital Medicine    Admit Date: 12/11/2017  Length of Stay:  LOS: 4 days     SUBJECTIVE:         Follow-up For:  AIDS (acquired immunodeficiency syndrome), CD4 <=200    HPI/Interval history (See H&P for complete P,F,SHx) :   12/12/17  s/p LP- non-infectious cytology with pending serology for VDRL, toxoplasmosis and  HSV.Fever of 103F last night. BC x2 obtained. s/p Dermatology and ID evaluation. s/p punch biopsy x2 for cultures and H&E    12/13/17  Fever with sinus tachycardia. NS Bolus 1L x 2 and continuous hydration    12/14/17  Sinus tachycardia improved. Fever of 101 F this AM, BC from 12/13 - NGTD. aerobic culture from left arm biopsy - staph aureus, Susceptibility pending    12/15/17  Plan for penicillin G continuous infusion with PICC for discharge to make a 14 day course. PICC team consulted     Review of Systems: List if applicable  Pain scale: 0 /10  Mild left knee pain    OBJECTIVE:     Vital Signs Range (Last 24H):  Temp:  [97.1 °F (36.2 °C)-100.6 °F (38.1 °C)]   Pulse:  []   Resp:  [16-20]   BP: ()/(52-74)   SpO2:  [96 %-100 %]     Physical Exam:  General- Patient alert and oriented x3   HEENT- PERRLA, EOMI, OP clear  Neck- No JVD, Lymphadenopathy, Thyromegaly  CV- Regular rate and rhythm, No Murmur/carlos/rubs  Resp- Lungs CTA Bilaterally, No increased WOB  Abdomen- Non tender/non-distended, BS normoactive x4 quads, no HSM  Extrem- No cyanosis, clubbing, edema.   Skin- No rashes, lesions, ulcers,  scattered distrubution of  lesions with sparing of palms and soles.   Neuro- Strength 5/5 flexors/extensors,Intact sensation to light touch grossly      Medications:  Medication list was reviewed and changes noted under Assessment/Plan.      Current Facility-Administered Medications:     0.9%  NaCl infusion, , Intravenous, Continuous, Brock Clement MD, Last Rate: 75 mL/hr at 12/15/17 0325    acetaminophen tablet 650 mg, 650 mg, Oral, Q6H PRN, Shimon Elliott PA-C, 650 mg  at 12/14/17 2109    hydrocodone-acetaminophen 5-325mg per tablet 1 tablet, 1 tablet, Oral, Q4H PRN, Leo Estevez MD, 1 tablet at 12/13/17 1452    moxifloxacin tablet 400 mg, 400 mg, Oral, Daily, Brock Clement MD, 400 mg at 12/15/17 0850    penicillin G potassium 20 Million Units in dextrose 5 % 500 mL CONTINUOUS INFUSION, 20 Million Units, Intravenous, Q24H, Frankie So MD, Last Rate: 20.8 mL/hr at 12/14/17 2210, 20 Million Units at 12/14/17 2210    promethazine tablet 25 mg, 25 mg, Oral, Q4H PRN, Leo Estevez MD    [START ON 12/16/2017] sulfamethoxazole-trimethoprim 800-160mg per tablet 1 tablet, 1 tablet, Oral, Daily, Brock Clement MD    acetaminophen, hydrocodone-acetaminophen 5-325mg, promethazine    Laboratory/Diagnostic Data:  Reviewed and noted in plan where applicable- Please see chart for full lab data.      Recent Labs  Lab 12/13/17 0452 12/14/17  0452 12/15/17  0344   WBC 5.39 4.88 2.70*   HGB 9.6* 8.5* 8.8*   HCT 30.0* 26.8* 28.2*   * 317 344         Recent Labs  Lab 12/13/17  0452 12/14/17  0452 12/15/17  0344   * 133* 138   K 4.5 4.1 3.9    103 103   CO2 26 25 27   BUN 10 11 8   CREATININE 0.9 0.8 0.8    112* 117*   CALCIUM 9.0 8.4* 8.9         Recent Labs  Lab 12/13/17  0452 12/14/17  0452 12/15/17  0344   ALKPHOS 78 74 74   ALT 17 18 25   AST 31 36 43*   ALBUMIN 2.2* 2.1* 2.1*   PROT 9.3* 8.6* 8.8*   BILITOT 0.4 0.3 0.2        Microbiology labs for the last week  Microbiology Results (last 7 days)     Procedure Component Value Units Date/Time    Blood culture [[id number]] Collected:  12/13/17 0845    Order Status:  Completed Specimen:  Blood Updated:  12/15/17 1022     Blood Culture, Routine No Growth to date     Blood Culture, Routine No Growth to date     Blood Culture, Routine No Growth to date    Blood culture [[id number]] Collected:  12/13/17 0845    Order Status:  Completed Specimen:  Blood Updated:  12/15/17 1022     Blood Culture,  Routine No Growth to date     Blood Culture, Routine No Growth to date     Blood Culture, Routine No Growth to date    CSF culture and Gram Stain (Tube 2) [[id number]] Collected:  12/11/17 1346    Order Status:  Completed Specimen:  CSF (Spinal Fluid) from CSF Tap, Tube 2 Updated:  12/15/17 0713     CSF CULTURE No Growth to date     Gram Stain Result No WBC's      No organisms seen    Narrative:       On which sequentially labeled tube should this analysis be  performed?->2    Blood culture [[id number]] Collected:  12/12/17 0058    Order Status:  Completed Specimen:  Blood Updated:  12/15/17 0612     Blood Culture, Routine No Growth to date     Blood Culture, Routine No Growth to date     Blood Culture, Routine No Growth to date     Blood Culture, Routine No Growth to date    Blood culture [[id number]] Collected:  12/12/17 0058    Order Status:  Completed Specimen:  Blood Updated:  12/15/17 0612     Blood Culture, Routine No Growth to date     Blood Culture, Routine No Growth to date     Blood Culture, Routine No Growth to date     Blood Culture, Routine No Growth to date    Culture, Anaerobe [[id number]] Collected:  12/12/17 1530    Order Status:  Completed Specimen:  Biopsy from Arm, Left Updated:  12/14/17 1412     Anaerobic Culture Culture in progress    Fungal culture , skin, hair, or nails [[id number]] Collected:  12/12/17 1529    Order Status:  Completed Specimen:  Skin, Hair, Nail from Skin Updated:  12/14/17 1403     Fungus Cult, skin, hair or nails Culture in progress    Fungus culture (Tube 3) [[id number]] Collected:  12/11/17 1346    Order Status:  Completed Specimen:  CSF (Spinal Fluid) from CSF Tap, Tube 2 Updated:  12/14/17 1403     Fungus (Mycology) Culture Culture in progress    Aerobic culture [[id number]] Collected:  12/12/17 1530    Order Status:  Completed Specimen:  Biopsy from Arm, Left Updated:  12/14/17 1133     Aerobic Bacterial Culture --     STAPHYLOCOCCUS AUREUS  Few  Susceptibility pending       AFB Culture & Smear [[id number]] Collected:  12/12/17 1530    Order Status:  Completed Specimen:  Biopsy from Arm, Left Updated:  12/13/17 2127     AFB Culture & Smear Culture in progress     AFB CULTURE STAIN No acid fast bacilli seen.    Cryptococcal antigen [[id number]] Collected:  12/12/17 0958    Order Status:  Completed Specimen:  Blood from Blood Updated:  12/13/17 1605     Cryptococcal Ag, Blood Negative    Cryptococcal antigen, CSF (Tube 3) [[id number]] Collected:  12/11/17 1346    Order Status:  Completed Specimen:  CSF (Spinal Fluid) from CSF Tap, Tube 2 Updated:  12/12/17 1232     Crypto Ag, CSF Negative    Narrative:       On which sequentially labeled tube should this analysis be  performed?->3    Cryptococcal antigen, CSF [[id number]] Collected:  12/11/17 1350    Order Status:  Completed Specimen:  CSF (Spinal Fluid) Updated:  12/12/17 1230     Crypto Ag, CSF Negative    Narrative:       Add on order [id number] Crypto. Per MD Terrance  12/12/2017  10:32     Cryptococcal antigen, CSF [[id number]]     Order Status:  Completed Specimen:  CSF (Spinal Fluid)     AFB Culture & Smear [[id number]]     Order Status:  Canceled Specimen:  Skin from Arm, Left     Aerobic culture [[id number]]     Order Status:  Canceled Specimen:  Skin from Arm, Left     Culture, Anaerobe [[id number]]     Order Status:  Canceled Specimen:  Skin from Arm, Left     Culture, Respiratory with Gram Stain [[id number]]     Order Status:  No result Specimen:  Respiratory     C. trachomatis/N. gonorrhoeae by AMP DNA Urine [[id number]] Collected:  12/11/17 1200    Order Status:  Completed Specimen:  Genital Updated:  12/11/17 1624     Chlamydia, Amplified DNA Not Detected     N gonorrhoeae, amplified DNA Not Detected    Narrative:       received yellow and gray top    C. trachomatis/N. gonorrhoeae by AMP DNA [[id number]] Collected:  12/11/17 1026    Order Status:  Canceled Updated:  12/11/17 1105    C. trachomatis/N. gonorrhoeae by AMP DNA Urine  [[id number]]     Order Status:  Completed Specimen:  Genital            Imaging Results          X-Ray Chest 1 View (Final result)  Result time 12/12/17 05:44:42    Final result by Keyshawn Barr MD (12/12/17 05:44:42)                 Impression:     No significant intrathoracic abnormality.  No significant detrimental interval change in the appearance of the chest since 10/27/16.      Electronically signed by: Keyshawn Barr MD  Date:     12/12/17  Time:    05:44              Narrative:    Comparison is made to 10/26/17.    Heart size is normal, as is the appearance of the pulmonary vascularity.  Lung zones are clear, and free of significant airspace consolidation or volume loss.  No pleural fluid.  No hilar or mediastinal mass lesion.  No pneumothorax.                             CT Head Without Contrast (Final result)  Result time 12/11/17 12:05:59    Final result by Brain Wong DO (12/11/17 12:05:59)                 Impression:     Soft tissue swelling/induration overlying the midline occipital calvarium concerning for subcutaneous hematoma without underlying fracture. Clinical correlation advised      Otherwise unremarkable  noncontrast CT head as detailed above specifically without evidence for acute intracranial hemorrhage. Further evaluation as warrented clinically.      Electronically signed by: BRAIN WONG DO  Date:     12/11/17  Time:    12:05              Narrative:    CT brain without contrast.    Comparison: None     Technique: Multiple 5 mm axial images of the head were obtained without intravenous contrast.    Results: There is slight focal soft tissue swelling and induration overlying the superior midline occipital calvarium without underlying calvarial fracture suggestive for subcutaneous hematoma. No evidence for acute intracranial hemorrhage or sulcal effacement. The ventricles are normal in size and configuration without evidence for hydrocephalus.  There is no midline shift or mass effect.  "Visualized paranasal sinuses and mastoid air cells are clear..                              Estimated body mass index is 24.14 kg/m² as calculated from the following:    Height as of this encounter: 5' 10" (1.778 m).    Weight as of this encounter: 76.3 kg (168 lb 3.4 oz).    I & O (Last 24H):  No intake or output data in the 24 hours ending 12/15/17 1351    Estimated Creatinine Clearance: 128 mL/min (based on SCr of 0.8 mg/dL).    ASSESSMENT/PLAN:     Active Problems:    Active Hospital Problems    Diagnosis  POA    *Syncope [R55]latent syphilis in 2016,s/p LP 12/11- non-infectious cytology with pending serology for CSF. Echo - Normal left ventricular systolic function (EF 60-65%).  No wall motion abnormalities    Neurosyphilis -  RPR and VDRL positive. HSV PCR and CSF crypto antigen negative. discussed with ID . Penicillin G IV continuous infusion. toxoplasmosis and  HSV. CSF crypto antigen negative.  CT head - unremarkable  noncontrast scan .  telemetry monitoring. EKG - Normal sinus rhythm.aerobic culture from left arm biopsy - staph aureus, Susceptibility pending .Plan for penicillin G continuous infusion with PICC for discharge to make a 14 day course. PICC team consulted      Fever with sinus tachycardia. NS Bolus 1L x 2 and continuous hydration. BC x2 obtained. s/p Dermatology and ID evaluation. U/A negative and CX ray -No significant intrathoracic abnormality. continued moxifloxacin per IDSinus tachycardia improved. 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    s/p punch biopsy x2 for cultures and H&E  Yes    AIDS (acquired immunodeficiency syndrome), CD4 <=200 [B20]AIDS - CD4 count of 164. does not take HIV medication (last use in " August 2017) and does not follow-up with infectious disease due to cost issues. does not want  HIV status disclosed with his grandmother and  coworkers. Discharge on Bactrim DS once daily for PJP ppx  Anemia - 8.6-->9.6. Normocytic.monitor   Yes      Resolved Hospital Problems    Diagnosis Date Resolved POA   No resolved problems to display.         Disposition- Home    DVT prophylaxis addressed with: Early ambulation            Brock Clement MD  Attending Staff Physician  [facility]  pager- [phone] [id number] - [phone]

## 2017-12-16 VITALS
TEMPERATURE: 96 F | SYSTOLIC BLOOD PRESSURE: 114 MMHG | DIASTOLIC BLOOD PRESSURE: 65 MMHG | HEART RATE: 95 BPM | WEIGHT: 168.19 LBS | RESPIRATION RATE: 16 BRPM | HEIGHT: 70 IN | BODY MASS INDEX: 24.08 KG/M2 | OXYGEN SATURATION: 98 %

## 2017-12-16 LAB
ALBUMIN SERPL BCP-MCNC: 2.1 G/DL
ALP SERPL-CCNC: 87 U/L
ALT SERPL W/O P-5'-P-CCNC: 33 U/L
ANION GAP SERPL CALC-SCNC: 7 MMOL/L
ASPERGILLUS AB SER QL ID: NORMAL
AST SERPL-CCNC: 47 U/L
B DERMAT AB SER QL ID: NORMAL
BASOPHILS # BLD AUTO: 0.01 K/UL
BASOPHILS NFR BLD: 0.3 %
BILIRUB SERPL-MCNC: 0.2 MG/DL
BUN SERPL-MCNC: 14 MG/DL
C IMMITIS AB SER QL ID: NORMAL
CALCIUM SERPL-MCNC: 8.8 MG/DL
CHLORIDE SERPL-SCNC: 102 MMOL/L
CMV SPEC QL SHELL VIAL CULT: NO GROWTH
CO2 SERPL-SCNC: 28 MMOL/L
CREAT SERPL-MCNC: 0.8 MG/DL
DIFFERENTIAL METHOD: ABNORMAL
EOSINOPHIL # BLD AUTO: 0 K/UL
EOSINOPHIL NFR BLD: 0.3 %
ERYTHROCYTE [DISTWIDTH] IN BLOOD BY AUTOMATED COUNT: 14.7 %
EST. GFR  (AFRICAN AMERICAN): >60 ML/MIN/1.73 M^2
EST. GFR  (NON AFRICAN AMERICAN): >60 ML/MIN/1.73 M^2
GLUCOSE SERPL-MCNC: 105 MG/DL
GRAM STN SPEC: NORMAL
GRAM STN SPEC: NORMAL
H CAPSUL AB TITR SER ID: NORMAL {TITER}
HCT VFR BLD AUTO: 28.3 %
HGB BLD-MCNC: 8.9 G/DL
IMM GRANULOCYTES # BLD AUTO: 0.03 K/UL
IMM GRANULOCYTES NFR BLD AUTO: 1 %
LYMPHOCYTES # BLD AUTO: 1 K/UL
LYMPHOCYTES NFR BLD: 33.7 %
MCH RBC QN AUTO: 29.2 PG
MCHC RBC AUTO-ENTMCNC: 31.4 G/DL
MCV RBC AUTO: 93 FL
MONOCYTES # BLD AUTO: 0.3 K/UL
MONOCYTES NFR BLD: 10.9 %
NEUTROPHILS # BLD AUTO: 1.6 K/UL
NEUTROPHILS NFR BLD: 53.8 %
NRBC BLD-RTO: 0 /100 WBC
PLATELET # BLD AUTO: 365 K/UL
PMV BLD AUTO: 9 FL
POTASSIUM SERPL-SCNC: 4.1 MMOL/L
PROT SERPL-MCNC: 8.6 G/DL
RBC # BLD AUTO: 3.05 M/UL
SODIUM SERPL-SCNC: 137 MMOL/L
WBC # BLD AUTO: 2.94 K/UL

## 2017-12-16 PROCEDURE — 36415 COLL VENOUS BLD VENIPUNCTURE: CPT

## 2017-12-16 PROCEDURE — 80053 COMPREHEN METABOLIC PANEL: CPT

## 2017-12-16 PROCEDURE — 99239 HOSP IP/OBS DSCHRG MGMT >30: CPT | Mod: ,,, | Performed by: HOSPITALIST

## 2017-12-16 PROCEDURE — 25000003 PHARM REV CODE 250: Performed by: HOSPITALIST

## 2017-12-16 PROCEDURE — 85025 COMPLETE CBC W/AUTO DIFF WBC: CPT

## 2017-12-16 PROCEDURE — A4216 STERILE WATER/SALINE, 10 ML: HCPCS | Performed by: HOSPITALIST

## 2017-12-16 RX ADMIN — SODIUM CHLORIDE, PRESERVATIVE FREE 10 ML: 5 INJECTION INTRAVENOUS at 12:12

## 2017-12-16 RX ADMIN — SULFAMETHOXAZOLE AND TRIMETHOPRIM 1 TABLET: 800; 160 TABLET ORAL at 09:12

## 2017-12-16 RX ADMIN — SODIUM CHLORIDE, PRESERVATIVE FREE 10 ML: 5 INJECTION INTRAVENOUS at 09:12

## 2017-12-16 RX ADMIN — SODIUM CHLORIDE: 9 INJECTION, SOLUTION INTRAVENOUS at 02:12

## 2017-12-16 RX ADMIN — MOXIFLOXACIN HYDROCHLORIDE 400 MG: 400 TABLET, FILM COATED ORAL at 09:12

## 2017-12-16 RX ADMIN — SODIUM CHLORIDE, PRESERVATIVE FREE 10 ML: 5 INJECTION INTRAVENOUS at 11:12

## 2017-12-16 NOTE — PROGRESS NOTES
D/c instruction explained to pt and pt verbalized understanding. Pt stated he waiting for nurse from [employer] to come back again to demonstrate to him how to administer antibiotic.

## 2017-12-16 NOTE — DISCHARGE SUMMARY
[facility] Hospital Medicine  Discharge Summary      Patient Name: Herson Chavez  MRN: [id number]  Admission Date: 12/11/2017  Hospital Length of Stay: 5 days  Discharge Date and Time:  12/16/2017 8:09 PM  Attending Physician: Brock Clement MD   Discharging Provider: Brock Clement MD  Primary Care Provider: Becky Sanders MD    Hospital Medicine Team: [facility] HOSP MED B Brock Clement MD    HPI: Mr Chavez is a 36 yo M with advanced HIV (last CD4 12/11/17 164), syphilis, no longer on ART citing medication costs, presents to the ED on 12/11 after a syncopal episode with +LOC for a few seconds, noticed dizziness/clamminess preceding the event. He is a surgical tech at [employer], and was standing with coworkers preceding the event, denies seizure like activity, has mild confusion upon waking. He has recently noted a lesion on his chin 2 weeks prior, with additional findings on neck, arm, trunk. He denies HA, chest pain, cough, SOB, other pain, n/v/d, penile discharge. He last took his ART in August 2017.     * No surgery found *      Hospital Course: ID is consulted for ART noncompliance in a patient with AIDS and new skin manifestations, syncope. LP and CSF studies and full micro workup were performed in the ED, results pending. He is currently on Avelox for empiric lung coverage of CAP, and Dermatology has been consulted for skin lesions regarding KS. Patient had been complaining of dry cough with skin rash prior to admission, diaphoresis, fevers at home. He last took Atripla in July 2017.     Active Hospital Problems     Diagnosis   POA    *Syncope [R55]latent syphilis in 2016,s/p LP 12/11- non-infectious cytology with pending serology for CSF. Echo - Normal left ventricular systolic function (EF 60-65%).  No wall motion abnormalities     Neurosyphilis -  RPR and VDRL positive. HSV PCR and CSF crypto antigen negative. discussed with ID . Penicillin G IV continuous infusion. toxoplasmosis  and  HSV. CSF crypto antigen negative.  CT head - unremarkable  noncontrast scan .  telemetry monitoring. EKG - Normal sinus rhythm.aerobic culture from left arm biopsy - MSSA .Plan for penicillin G continuous infusion with PICC for discharge to make a 14 day course. PICC team consulted       Fever with sinus tachycardia. resolved with hydration BC x2 obtained. s/p Dermatology and ID evaluation. U/A negative and CX ray -No significant intrathoracic abnormality. continued moxifloxacin per IDSinus tachycardia improved. 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    s/p punch biopsy x2 for cultures and H&E   Yes    AIDS (acquired immunodeficiency syndrome), CD4 <=200 [B20]AIDS - CD4 count of 164. does not take HIV medication (last use in August 2017) and does not follow-up with infectious disease due to cost issues. Grandmother aware of HIV status.Discharge on Bactrim DS once daily for PJP ppx  Anemia - 8.6-->9.6. Normocytic.monitor    Yes         Being discharged with ID and dermatology follow up     Consults:   Consults         Status Ordering Provider     Inpatient consult to Dermatology  Once     Provider:  (Not yet assigned)    Completed BLANQUITA AVELAR     Inpatient consult to Infectious Diseases  Once     Provider:  (Not yet assigned)    Completed BLANQUITA AVELAR     Inpatient consult to PICC team ([facility])  Once     Provider:  (Not yet assigned)    Completed TIARA CALIXTO     Inpatient consult to Social Work  Once     Provider:  (Not yet assigned)    Acknowledged TIARA CALIXTO          Final Active Diagnoses:    Diagnosis Date Noted POA    PRINCIPAL PROBLEM:  AIDS (acquired immunodeficiency syndrome), CD4 <=200 [B20] 10/25/2016 Yes    Neurosyphilis [A52.3]  12/13/2017 Yes    Skin lesions [L98.9] 12/12/2017 Yes      Problems Resolved During this Admission:    Diagnosis Date Noted Date Resolved POA    Syncope [R55] 12/11/2017 12/15/2017 Yes      Discharged Condition: fair    Disposition: Home or Self Care    Follow Up:  Follow-up Information     Becky Sanders MD In 1 week.    Specialty:  Family Medicine  Contact information:  [address]  [place] [address]  [phone]             Antonella Morrell MD On 1/8/2018.    Specialty:  Infectious Diseases  Why:  appointment 2:00pm  Contact information:  [address]  [facility] [address]  [phone]                 Patient Instructions:     Ambulatory Referral to Infectious Disease   Referral Priority: Routine Referral Type: Consultation   Referral Reason: Specialty Services Required    Requested Specialty: Infectious Diseases    Number of Visits Requested: 1      Ambulatory Referral to Dermatology   Referral Priority: Routine Referral Type: Consultation   Referral Reason: Specialty Services Required    Requested Specialty: Dermatology    Number of Visits Requested: 1      Diet general     Activity as tolerated     Call MD for:  temperature >100.4     Call MD for:  severe uncontrolled pain     Call MD for:  worsening rash       Medications:  Reconciled Home Medications:   Current Discharge Medication List      START taking these medications    Details   dextrose 5 % SolP 500 mL with penicillin G potassium 5 million unit SolR 20 Million Units Inject 20 Million Units into the vein continuous.      moxifloxacin (AVELOX) 400 mg tablet Take 1 tablet (400 mg total) by mouth once daily.  Qty: 2 tablet, Refills: 0      sulfamethoxazole-trimethoprim 800-160mg (BACTRIM DS) 800-160 mg Tab Take 1 tablet by mouth once daily.         STOP taking these medications       ALBUTEROL INHL Comments:   Reason for Stopping:         levoFLOXacin (LEVAQUIN) 500 MG tablet Comments:   Reason for Stopping:          hydrocodone-acetaminophen 5-325mg (NORCO) 5-325 mg per tablet Comments:   Reason for Stopping:         promethazine (PHENERGAN) 25 MG tablet Comments:   Reason for Stopping:               Significant Diagnostic Studies: Labs:   BMP:     Recent Labs  Lab 12/15/17  0344 12/16/17  0340   * 105    137   K 3.9 4.1    102   CO2 27 28   BUN 8 14   CREATININE 0.8 0.8   CALCIUM 8.9 8.8   , CMP     Recent Labs  Lab 12/15/17  0344 12/16/17  0340    137   K 3.9 4.1    102   CO2 27 28   * 105   BUN 8 14   CREATININE 0.8 0.8   CALCIUM 8.9 8.8   PROT 8.8* 8.6*   ALBUMIN 2.1* 2.1*   BILITOT 0.2 0.2   ALKPHOS 74 87   AST 43* 47*   ALT 25 33   ANIONGAP 8 7*   ESTGFRAFRICA >60.0 >60.0   EGFRNONAA >60.0 >60.0   , CBC     Recent Labs  Lab 12/15/17  0344 12/16/17  0340   WBC 2.70* 2.94*   HGB 8.8* 8.9*   HCT 28.2* 28.3*    365*   , INR   Lab Results   Component Value Date    INR 1.1 10/28/2016   , Lipid Panel No results found for: CHOL, HDL, LDLCALC, TRIG, CHOLHDL, Troponin No results for input(s): TROPONINI in the last 168 hours., A1C: No results for input(s): HGBA1C in the last 4320 hours. and All labs within the past 24 hours have been reviewed  Microbiology:   Blood Culture   Lab Results   Component Value Date    LABBLOO No Growth to date 12/13/2017    LABBLOO No Growth to date 12/13/2017    LABBLOO No Growth to date 12/13/2017    LABBLOO No Growth to date 12/13/2017    LABBLOO No Growth to date 12/13/2017    LABBLOO No Growth to date 12/13/2017    LABBLOO No Growth to date 12/13/2017    LABBLOO No Growth to date 12/13/2017   , Sputum Culture No results found for: GSRESP, RESPIRATORYC, Urine Culture    Lab Results   Component Value Date    LABURIN No growth 10/27/2016      Radiology:   Imaging Results          X-Ray Chest 1 View for PICC_Central line (Final result)  Result time 12/15/17 16:09:57    Final result by Bradford Springer III, MD (12/15/17 16:09:57)                 Impression:      No acute process seen.      Electronically signed by: BOLIVAR DUKES MD  Date:     12/15/17  Time:    16:09              Narrative:    One view: The central line in lower SVC. Heart size is normal. Lungs are clear.                             X-Ray Chest 1 View (Final result)  Result time 12/12/17 05:44:42    Final result by Keyshawn Barr MD (12/12/17 05:44:42)                 Impression:     No significant intrathoracic abnormality.  No significant detrimental interval change in the appearance of the chest since 10/27/16.      Electronically signed by: Keyshawn Barr MD  Date:     12/12/17  Time:    05:44              Narrative:    Comparison is made to 10/26/17.    Heart size is normal, as is the appearance of the pulmonary vascularity.  Lung zones are clear, and free of significant airspace consolidation or volume loss.  No pleural fluid.  No hilar or mediastinal mass lesion.  No pneumothorax.                             CT Head Without Contrast (Final result)  Result time 12/11/17 12:05:59    Final result by Brain Wong DO (12/11/17 12:05:59)                 Impression:     Soft tissue swelling/induration overlying the midline occipital calvarium concerning for subcutaneous hematoma without underlying fracture. Clinical correlation advised      Otherwise unremarkable  noncontrast CT head as detailed above specifically without evidence for acute intracranial hemorrhage. Further evaluation as warrented clinically.      Electronically signed by: BRAIN WONG DO  Date:     12/11/17  Time:    12:05              Narrative:    CT brain without contrast.    Comparison: None     Technique: Multiple 5 mm axial images of the head were obtained without intravenous contrast.    Results: There is slight focal soft tissue swelling and induration overlying the superior midline occipital calvarium without underlying calvarial fracture suggestive for subcutaneous hematoma. No evidence for acute intracranial hemorrhage or sulcal  effacement. The ventricles are normal in size and configuration without evidence for hydrocephalus.  There is no midline shift or mass effect. Visualized paranasal sinuses and mastoid air cells are clear..                            Cardiac Graphics: Sinus tachycardia @133bpm    Pending Diagnostic Studies:     Procedure Component Value Units Date/Time    Freeze and Hold, [facility] [[id number]] Collected:  12/11/17 1346    Order Status:  Sent Lab Status:  No result     Specimen:  CSF (Spinal Fluid) from Cerebrospinal Fluid     Fungal Immunodiffusion - Blood [[id number]] Collected:  12/12/17 1115    Order Status:  Sent Lab Status:  In process Updated:  12/12/17 1126    Specimen:  Blood from Blood     HIV RNA, quantitative, PCR [[id number]] Collected:  12/15/17 1247    Order Status:  Sent Lab Status:  In process Updated:  12/15/17 1253    Specimen:  Blood from Blood     HIV-1 GenotypR PLUS [[id number]] Collected:  12/15/17 1247    Order Status:  Sent Lab Status:  In process Updated:  12/15/17 1253    Specimen:  Blood from Blood     HIV-1 INTEGRASE GENOTYPE [[id number]] Collected:  12/15/17 1247    Order Status:  Sent Lab Status:  In process Updated:  12/15/17 1253    Specimen:  Blood from Blood         Indwelling Lines/Drains at time of discharge:   Lines/Drains/Airways     Peripherally Inserted Central Catheter Line                 PICC Double Lumen 12/15/17 1514 right basilic less than 1 day                Brock Clement MD  Department of Hospital Medicine  [facility]

## 2017-12-16 NOTE — PLAN OF CARE
Jeison at [facility], [phone] called MSW stating that patient is financially cleared from his stand point.  He only has to come out to connect patient.    Brittany Evans LMSW

## 2017-12-16 NOTE — PLAN OF CARE
Dr. Clement advised that pt is ready to discharge today with home health and I.V. Antibiotics. Weekend MSW f/u with Mojgan at [employer] [phone] who advised that she need to check to see if patient was in agreement with copay information.  She will call MSW back once she get details of what patient will owe.  She also needed confirmation of PICC line placement.  MSW faxed PICC information.    Brittany Evans LMSW

## 2017-12-16 NOTE — PROGRESS NOTES
Pt d/c order in. This nurse talked to Brittany Evans CM, and Brittany said that pt is discharge home with antibiotic and has to wait for nurse from [employer] to come and explain the instruction.

## 2017-12-16 NOTE — PLAN OF CARE
Problem: Patient Care Overview  Goal: Plan of Care Review  Outcome: Ongoing (interventions implemented as appropriate)  Plan of care went over with patient: frequent v/s, frequent checks, maintaining IV site (PICC line), rest and sleep and tele monitoring.     Pt did well, rested well. No temp last night at all. Will continue to monitor.

## 2017-12-17 LAB
BACTERIA BLD CULT: NORMAL
BACTERIA BLD CULT: NORMAL

## 2017-12-18 LAB
BACTERIA BLD CULT: NORMAL
BACTERIA BLD CULT: NORMAL
BACTERIA SPEC ANAEROBE CULT: NORMAL
HIV UQ DATE RECEIVED: ABNORMAL
HIV UQ DATE REPORTED: ABNORMAL
HIV1 RNA # SERPL NAA+PROBE: ABNORMAL COPIES/ML
HIV1 RNA SERPL NAA+PROBE-LOG#: 5.01 LOG (10) COPIES/ML
HIV1 RNA SERPL QL NAA+PROBE: DETECTED

## 2017-12-18 RX ORDER — SULFAMETHOXAZOLE AND TRIMETHOPRIM 800; 160 MG/1; MG/1
1 TABLET ORAL DAILY
Qty: 30 TABLET | Refills: 6 | Status: SHIPPED | OUTPATIENT
Start: 2017-12-18 | End: 2018-05-14 | Stop reason: SDUPTHER

## 2017-12-18 NOTE — TELEPHONE ENCOUNTER
Patient called stating he was rx bactrim before d/c from hospital. Pharmacy did not receive the rx. (the rx said no print)  Patient is asking if rx can be sent to the pharmacy again for him?  Please sign rx.

## 2017-12-18 NOTE — TELEPHONE ENCOUNTER
----- Message from Kamila Castellanos sent at 12/18/2017  9:03 AM CST -----  Contact: Self- [phone]  Pt says was released from hospital missing the following:  sulfamethoxazole-trimethoprim 800-160mg (BACTRIM DS) 800-160 mg Tab-issue from   Could you check to see if needs to be re-sent.      ..  [facility]/pharmacy #[id number] - [place], [place] - [address]  [address] [employer] [facility] [address]  Phone: [phone] Fax: [phone]

## 2017-12-18 NOTE — TELEPHONE ENCOUNTER
sulfamethoxazole-trimethoprim 800-160mg (BACTRIM DS) 800-160 mg Tab   12/16/2017  No   Sig - Route: Take 1 tablet by mouth once daily. - Oral   Class: No Print   Order: [id number]   Date/Time Signed: 12/15/2017 13:42       Pharmacy     [facility]/PHARMACY #[id number] - [place] - [address]

## 2017-12-19 ENCOUNTER — PATIENT OUTREACH (OUTPATIENT)
Dept: ADMINISTRATIVE | Facility: CLINIC | Age: 37
End: 2017-12-19

## 2017-12-19 NOTE — PATIENT INSTRUCTIONS
Discharge Instructions for HIV Infection and AIDS  Youve been diagnosed with HIV (human immunodeficiency virus). This is the virus that causes AIDS, a disease than can be life threatening. HIV attacks the body's immune system, making it tougher to fight infection. For most people, infections are normally not severe or fatal. However, for people with HIV or AIDS, these infections can cause death because the body cant fight them. Unlike other viruses, the body can't get rid of HIV. Here's what you can do to help stay healthier and prevent the spread of HIV.  Medical care  · Take your medicine exactly as directed.  ¨ Dont take any other medicine, including over-the-counter drugs or supplements, unless your healthcare provider says its OK.  ¨ Report any side effects to your provider.  · See your healthcare provider regularly. Your provider will need to follow you closely for the rest of your life.  · Tell all your healthcare providers that you are HIV-positive. This includes dentists and dental hygienists.  · Talk to your provider before getting vaccines. There are some vaccines you should avoid.  Help prevent the spread of HIV  · Practice safe sex. Use condoms correctly and use them every time you have sex. Don't risk spreading your illness to noninfected people.  · Ask any sexual partners to be tested for HIV. If you are in a stable sexual relationship, your uninfected partner may want to discuss with his or her provider taking PrEP (pre-exposure prophylaxis) to prevent spread of HIV.  · Never share needles or other equipment for drug use.  · If you get tattoos or have any parts of your body pierced, be sure that the needles are destroyed afterward.  · Don't donate blood, plasma, semen, or organs.  · If you are a woman, talk with your healthcare provider before getting pregnant.  · Talk with your healthcare provider about other very rare ways HIV may be spread.  Reduce your risk for infection  · Take care of  your skin.  ¨ Wash your hands often. Use soap and water and rub your hands together. If soap and water is not available, use alcohol-based hand . Make sure you wash your hands before and after taking care of any cuts, scrapes, or wounds.  ¨ Use hypoallergenic sunscreen with an SPF of 30 or greater.  ¨ Avoid direct sun exposure on your skin.  ¨ Use an electric razor for shaving.  · Ask your doctor before using cosmetics, contact lenses, tampons, or douches.  · Avoid contact with farm, stray, or unknown animals.  ¨ If you do have contact with an animal, wash your hands afterward.  ¨ Avoid contact with pet urine or stool. Wear gloves if you might come in contact with pet urine or stool.  ¨ Dont clean litter boxes, cages, or aquariums.  · Keep your home clean.  ¨ Clean floors, carpets, furniture, and countertops regularly.  ¨ Be sure your bathroom is clean.  ¨ Wash your hands after handling trash.  ¨ If you have a pet, talk with your provider about safe pet ownership.  · Don't eat undercooked, unpasteurized, or unwashed foods.  · Dont smoke or use tobacco products.  · Dont use portable humidifiers or vaporizers.  · Avoid contact with anyone who has a cold, the flu, or other contagious condition. This includes measles, chickenpox, herpes, viruses, pinkeye, cough, or sore throat.  Follow-up care  Follow up with your healthcare provider, or as advised.     When to seek medical care  Call your healthcare provider right away if you have any of the following:  · Blurred vision or other eye problems  · Trouble concentrating or worsening tiredness  · Wheezing, trouble breathing, or shortness of breath  · Rapid, irregular heartbeat  · Dizziness or lightheadedness  · Rash or hives  · Cut or rash that swells, turns red, feels hot or painful, or begins to ooze  · Fever of 100.4°F (38°C) or higher, or as directed by your provider  · Diarrhea that does not go away after 2 loose stools  · Pain or cramping in your belly  (abdomen)   Date Last Reviewed: 12/1/2016  © 7747-5239 [employer] [address] [place] [address], [place] [address]. All rights reserved. This information is not intended as a substitute for professional medical care. Always follow your healthcare professional's instructions.

## 2017-12-20 LAB — T PALLIDUM AB SER QL IF: REACTIVE

## 2017-12-21 LAB
DOLUTEGRAVIR ISLT GENOTYP: NORMAL
ELVITEGRAVIR ISLT GENOTYP: NORMAL
HIV-1 GENOTYPIC INTEGRASE RESIST.: NORMAL
INTEGRASE MUTATIONS: NORMAL
RALTEGRAVIR ISLT GENOTYP: NORMAL

## 2017-12-27 ENCOUNTER — OFFICE VISIT (OUTPATIENT)
Dept: FAMILY MEDICINE | Facility: CLINIC | Age: 37
End: 2017-12-27
Payer: COMMERCIAL

## 2017-12-27 VITALS
WEIGHT: 162.06 LBS | BODY MASS INDEX: 23.2 KG/M2 | SYSTOLIC BLOOD PRESSURE: 112 MMHG | OXYGEN SATURATION: 98 % | HEART RATE: 99 BPM | DIASTOLIC BLOOD PRESSURE: 84 MMHG | TEMPERATURE: 98 F | HEIGHT: 70 IN | RESPIRATION RATE: 16 BRPM

## 2017-12-27 DIAGNOSIS — Z23 NEED FOR PNEUMOCOCCAL VACCINATION: ICD-10-CM

## 2017-12-27 DIAGNOSIS — A53.0 LATENT SYPHILIS: ICD-10-CM

## 2017-12-27 DIAGNOSIS — Z13.220 SCREENING FOR CHOLESTEROL LEVEL: ICD-10-CM

## 2017-12-27 DIAGNOSIS — B20 AIDS (ACQUIRED IMMUNODEFICIENCY SYNDROME), CD4 <=200: Primary | ICD-10-CM

## 2017-12-27 PROCEDURE — 99496 TRANSJ CARE MGMT HIGH F2F 7D: CPT | Mod: S$GLB,,, | Performed by: PHYSICIAN ASSISTANT

## 2017-12-27 PROCEDURE — 90670 PCV13 VACCINE IM: CPT | Mod: S$GLB,,, | Performed by: FAMILY MEDICINE

## 2017-12-27 PROCEDURE — 90471 IMMUNIZATION ADMIN: CPT | Mod: S$GLB,,, | Performed by: FAMILY MEDICINE

## 2017-12-27 PROCEDURE — 99999 PR PBB SHADOW E&M-EST. PATIENT-LVL IV: CPT | Mod: PBBFAC,,, | Performed by: PHYSICIAN ASSISTANT

## 2017-12-27 NOTE — PROGRESS NOTES
Influenza Vaccine - got done with job   Lipid Panel- will get today   Pneumococcal Vaccine - consult

## 2017-12-28 ENCOUNTER — TELEPHONE (OUTPATIENT)
Dept: DERMATOLOGY | Facility: CLINIC | Age: 37
End: 2017-12-28

## 2017-12-28 LAB — HIV GENTYP ISLT: DETECTED

## 2017-12-28 NOTE — TELEPHONE ENCOUNTER
Schedule pt for 2-3 wk f/u with [facility]. Informed pt that if we need to change appointment, we will give him a call.    ----- Message from Sandrita Wade MD sent at 12/27/2017  9:42 AM CST -----  Can you please schedule a FU appt for his recent hospitalization?     Thank you.    Sandrita Wade

## 2017-12-29 ENCOUNTER — OUTPATIENT CASE MANAGEMENT (OUTPATIENT)
Dept: ADMINISTRATIVE | Facility: OTHER | Age: 37
End: 2017-12-29

## 2017-12-29 LAB
SPECIMEN SOURCE: NORMAL
VIRUS SPEC CULT: NORMAL

## 2017-12-29 NOTE — PROGRESS NOTES
This LMSW received a referral on the above patient.   Reason for referral: Low risk, AIDS (acquired immunodeficiency syndrome), CD4<=200  Name of the community resource that was provided: [facility], [facility], List of Services/Resources for Medication Assistance, Advance directive forms  Resource given to: Patient via [place] mail/telephone    This LMSW completed assessment with patient. Pt reports living with family and reports being independent with ADLs. Pt reports that he works full time and drives himself to medical appointments. Pt reports that his primary issues is not being able to afford his medications prescribed for HIV. Pt reports that he was prescribed Atripla and could not afford to pay 2200 each month for this medication and therefore could not compliant with medications. Pt reports that he attempted to obtain assistance with his medications but was told by agency reps that he was over the income criteria. When asked his annually income patient reports that he receives approximately 37,000 annually. LMSW advised Pt that he should be eligible for services under [facility] Program and encouraged patient to contact [facility] for medications assistance. LMSW also provided patient with contact information for the health department and list of organizations who receive AIDS funding to provide community services. No further needs identified by Pt and referral source notified.

## 2017-12-29 NOTE — PROGRESS NOTES
Thank you for the referral.  Patient has been assigned to Shirlene Cook LMSW for low risk screening for Outpatient Case Management.     Reason for referral:   AIDS (acquired immunodeficiency syndrome), CD4 <=200  Latent syphilis    Please contact [facility] at ext. [phone] with any questions.    Thank you,  Tiffani Miller

## 2018-01-03 ENCOUNTER — OFFICE VISIT (OUTPATIENT)
Dept: DERMATOLOGY | Facility: CLINIC | Age: 38
End: 2018-01-03
Payer: COMMERCIAL

## 2018-01-03 DIAGNOSIS — L41.0 PITYRIASIS LICHENOIDES: Primary | ICD-10-CM

## 2018-01-03 PROCEDURE — 99999 PR PBB SHADOW E&M-EST. PATIENT-LVL II: CPT | Mod: PBBFAC,,, | Performed by: DERMATOLOGY

## 2018-01-03 PROCEDURE — 99214 OFFICE O/P EST MOD 30 MIN: CPT | Mod: S$GLB,,, | Performed by: DERMATOLOGY

## 2018-01-03 NOTE — PROGRESS NOTES
Subjective:       Patient ID:  Herson Chavez is a 37 y.o. male who presents for   Chief Complaint   Patient presents with    Follow-up     ER f/u     HPI    36 yo M presents for hospital follow up.  He was originally seen as an inpatient on 12/12/17 at which time biopsies were performed and sent for H&E (biopsy c/w pityriasis lichenoides) and tissue culture (L arm).      Add'l Hx from patient's recent admission:  He has AIDS (CD4 count 164), is noncompliant on medication (last taken HAART August 2017 2/2 to cost), Hepatitis C, and latent syphilis admitted 12/11/17 for LOC with bowel incontinence and admitted for further workup. Negative head CT. CSF was VDRL + on this admission.  Of note patient had latent syphilis in 2016 with LP 11/2016 with negative VDRL in CSF.      Patient states about 3 weeks prior to admission he started noticing lesions appearing all over his body. They were described as asymptomatic and denied any itching or tenderness associated with these except the large lip lesion. They started out as small bumps that progressed to larger scaly bumps.  Denies bleeding or ulceration.  Denied any recent travel or contact with cats.  +15 pound weight loss in past month with night sweats    IV PCN x 14 days  On Bactrim DS daily for prophylaxis  Not on HAART. Undergoing HIV genotyping to rule out any mutations which may have developed since the use of ATripla earlier this year  He is trying to get enrolled in patient assistance program to assist with the cost of the medication    Past Medical History:   Diagnosis Date    Anemia of other chronic disease 10/26/2016    HIV (human immunodeficiency virus infection)     Syphilis      Review of Systems   Constitutional: Positive for weight loss.   Skin: Positive for rash.        Objective:    Physical Exam   Constitutional: He appears well-developed and well-nourished.   Neurological: He is alert and oriented to person, place, and time.   Psychiatric: He  has a normal mood and affect.   Skin:   Areas Examined (abnormalities noted in diagram):   Scalp / Hair Palpated and Inspected  Head / Face Inspection Performed  Neck Inspection Performed  Chest / Axilla Inspection Performed  Back Inspection Performed  RUE Inspected  LUE Inspection Performed  LLE Inspection Performed                                     Face (inpatient)      Face (2-3 weeks later)        Diagram Legend     Erythematous scaling macule/papule c/w actinic keratosis       Vascular papule c/w angioma      Pigmented verrucoid papule/plaque c/w seborrheic keratosis      Yellow umbilicated papule c/w sebaceous hyperplasia      Irregularly shaped tan macule c/w lentigo     1-2 mm smooth white papules consistent with Milia      Movable subcutaneous cyst with punctum c/w epidermal inclusion cyst      Subcutaneous movable cyst c/w pilar cyst      Firm pink to brown papule c/w dermatofibroma      Pedunculated fleshy papule(s) c/w skin tag(s)      Evenly pigmented macule c/w junctional nevus     Mildly variegated pigmented, slightly irregular-bordered macule c/w mildly atypical nevus      Flesh colored to evenly pigmented papule c/w intradermal nevus       Pink pearly papule/plaque c/w basal cell carcinoma      Erythematous hyperkeratotic cursted plaque c/w SCC      Surgical scar with no sign of skin cancer recurrence      Open and closed comedones      Inflammatory papules and pustules      Verrucoid papule consistent consistent with wart     Erythematous eczematous patches and plaques     Dystrophic onycholytic nail with subungual debris c/w onychomycosis     Umbilicated papule    Erythematous-base heme-crusted tan verrucoid plaque consistent with inflamed seborrheic keratosis     Erythematous Silvery Scaling Plaque c/w Psoriasis     See annotation    PATHOLOGY  Thus far, tissue culture + for few Staph only (sensitive to clindamycin, Bactrim, oxacillin, tetracyclines)    Skin, left arm, punch biopsy:  - CHANGES  MOST CONSISTENT WITH PITYRIASIS LICHENOIDES (SEE MICROSCOPIC DESCRIPTION).  MICROSCOPIC DESCRIPTION: The biopsy shows widespread parakeratosis associated with neutrophils. The  epidermis is remarkable for prominent exocytosis of lymphocytes, mild spongiosis and vacuolar-interface changes  with dyskeratotic cells located at various levels throughout the epidermis. There is a superficial and mid dermal  wedge shaped lymphohistiocytic infiltrate. Extravasated erythocytes are noted within the papillary dermis and  epidermis. CD31 highlights highlights multiple small blood vessels within the dermis. Gram stain highlights rather  sparsely scattered bacteria within the stratum corneum. GMS and AFB stains are negative for the presence of fungi  and mycobacteria, respectively. HHV 8 and spirochete immunohistochemical stains are also negative. A large  majority of the lymphocytes within the dermis and within the epidermis stain positive for both CD3 and CD4, while  CD8 stains a sparser population of cells. The approximate CD4:CD8 ratio is 2:1. CD30 is negative. Appropriately  reactive controls were reviewed. Multiple levels were examined.    Assessment / Plan:        Pityriasis lichenoides  Impressive improvement clinically  The cause of pityriasis lichenoides is unknown but 3 major theories exist:  inflammatory reaction triggered by infectious agent, a relatively benign form of T-cell lymphoproliferative disorder, and an immune-complex-mediated hypersensitivity vasculitis.  Infections that have been associated with both PLC and PLEVA include: Toxoplasma gondii, EBV, HIV, CMV, Staphylococcus aureus (tissue culture grew few Staph aureus).    Pt is asymptomatic and declined further treatment at this time.  If he becomes symptomatic, can consider topical steroid vs doxycycline.  2nd line treatment options include phototherapy  Sensitivities showed that the Staph was resistant to PCN although he did clinically improve after the  IV PCN.  Consider dosing Bactrim DS bid for 7-10 days to cover the Staph + tissue culture.  *Pt is seeing ID on 1/8/17*           Return in about 4 weeks (around 1/31/2018).

## 2018-01-08 ENCOUNTER — OFFICE VISIT (OUTPATIENT)
Dept: INFECTIOUS DISEASES | Facility: CLINIC | Age: 38
End: 2018-01-08
Payer: COMMERCIAL

## 2018-01-08 VITALS
BODY MASS INDEX: 24.18 KG/M2 | WEIGHT: 168.88 LBS | DIASTOLIC BLOOD PRESSURE: 76 MMHG | SYSTOLIC BLOOD PRESSURE: 127 MMHG | HEART RATE: 98 BPM | TEMPERATURE: 99 F | HEIGHT: 70 IN

## 2018-01-08 DIAGNOSIS — R79.89 ELEVATED LFTS: ICD-10-CM

## 2018-01-08 DIAGNOSIS — R76.8 HEPATITIS C ANTIBODY POSITIVE IN BLOOD: ICD-10-CM

## 2018-01-08 DIAGNOSIS — B20 AIDS (ACQUIRED IMMUNODEFICIENCY SYNDROME), CD4 <=200: Primary | ICD-10-CM

## 2018-01-08 DIAGNOSIS — A52.3 NEUROSYPHILIS: ICD-10-CM

## 2018-01-08 DIAGNOSIS — L98.9 SKIN LESIONS: ICD-10-CM

## 2018-01-08 PROCEDURE — 99215 OFFICE O/P EST HI 40 MIN: CPT | Mod: S$GLB,,, | Performed by: INTERNAL MEDICINE

## 2018-01-08 PROCEDURE — 99999 PR PBB SHADOW E&M-EST. PATIENT-LVL III: CPT | Mod: PBBFAC,,, | Performed by: INTERNAL MEDICINE

## 2018-01-08 NOTE — PROGRESS NOTES
Subjective:      Patient ID: Herson Chavez is a 37 y.o. male.    Chief Complaint:Hospital Follow Up      History of Present Illness    1. Newly diagnosed HIV - HIV test positive 4/2016; Atripla 10/16 thru 7/2017; last negative test ~ 2009.   - no drugs   - sex with men   - no partner   - sexually active - none at present   - tolerated Atripla ok   - syphilis - treated with IV PCN x 14 days    Component      Latest Ref Rng & Units 12/11/2017 10/26/2016   CD4 % [place] T Cell      28 - 57 % 10.6 (L) 13.3 (L)   Absolute CD4      300 - 1400 cells/ul 164 (L) 265 (L)     Component      Latest Ref Rng & Units 12/15/2017 10/25/2016   HIV-1 RNA      Not detected DETECTED (A) DETECTED (A)   HIV 1 RNA Ultra      <40 Copies/mL 102,737 (H) 76,966 (H)   Log HIV Copies/mL      <1.60 Log (10) Copies/mL 5.01 (H) 4.89 (H)   HIV UQ Date Received       12/17/17 10/26/16   HIV UQ Date Reported       12/18/17 10/27/16     Antiretroviral drugs      Resistance  Mutations Detected   Predicted   ___________________________________________________________   NRTIs                             ZDV (zidovudine or Retrovir)      NO       ABC (abacavir or Ziagen)          NO       ddI (didanosine or Videx)         NO       3TC (lamivudine or Epivir)        NO       FTC (emtricitabine or Emtriva)    NO       d4T (stavudine or Zerit)          NO       TDF (tenofovir or Viread)         NO       ________________________________ ___ ______________________   NNRTIs                           ETR (etravirine or Intelence)     NO       EFV (efavirenz or Sustiva)        NO       NVP (nevirapine or Viramune)      NO       RPV (rilpivirine or Edurant)      NO       ________________________________ ___ ______________________   PIs                               FPV (fos-amprenavir or Lexiva)    NO       IDV (indinavir or Crixivan)       NO       NFV (nelfinavir or Viracept)      NO       SQV (saquinavir or Invirase)      NO       LPV (lopinavir or Kaletra)         NO       ATV (atazanavir or Reyataz)       NO       TPV (tipranavir or Aptivus)       NO       DRV (darunavir or Prezista)       NO      Immunizations     Name Date   Influenza - Quadrivalent - PF 12/07/17   : Sanofi Pasteur   Influenza - Quadrivalent - PF 10/29/16   : Sanofi Pasteur   Pneumococcal Conjugate - 13 Valent 12/27/17   : [employer]   TDAP 10/25/16   : Sanofi Pasteur     2. Syphilis - completed 14 days of PCN. All skin lesions resolved.           Component      Latest Ref Rng & Units 12/12/2017 12/11/2017 11/17/2016   Heme Aliquot      mL  1.8 2.0   Appearance, CSF      Clear  Clear Clear   Color, CSF      Colorless  Colorless Colorless   WBC, CSF      0 - 5 /cu mm  3 8 (H)   RBC, CSF      0 /cu mm  0 0   Segmented Neutrophils, CSF      0 - 6 %  1 CANCELED   Lymphs, CSF      40 - 80 %  75 99 (H)   Mono/Macrophage, CSF      15 - 45 %  24 1 (L)   Coccidioides      None Detected None Detected     Aspergillus Antibody      None Detected None Detected     Blastomyces Antibody      None Detected None Detected     Histoplasma Ab, Immunodiffusion      None Detected None Detected     HSV PCR Source        Test Not Performed    HSV1, PCR        Test Not Performed    HSV2, PCR        Test Not Performed    CD4 % [place] T Cell      28 - 57 %  10.6 (L)    Absolute CD4      300 - 1400 cells/ul  164 (L)    T. gondii Source        CSF    T. gondii by PCR      Negative  Negative    Viral Culture Source        Cerebrospinal Fluid    Viral Culture:        No virus isolated    Histoplasma Result      ng/mL None Detected     Histoplasma Ag Interp.       Negative     Histoplasma Antigen Urine      Negative Negative     Histoplasma Ag Value      ng/mL 0.00     HSV1, PCR, CSF      Negative  Negative    HSV2, PCR, CSF      Negative  Negative    Protein, CSF      15 - 40 mg/dL  35 46 (H)   VDRL, CSF      Non-reactive  Reactive (A) Non-reactive   RPR      Non-reactive  Reactive  (A)    Glucose, CSF      40 - 70 mg/dL  57    Fungitell Assay      <80 pg/mL <31     VDRL Quantitative        1:2 (A)    FTA-ABS      Non-reactive  Reactive (A)    RPR Quantitative        1:256 (A)      Component      Latest Ref Rng & Units 12/11/2017 10/27/2016   RPR Quantitative       1:256 (A) 1:4 (A)  Treatment?     Component      Latest Ref Rng & Units 12/11/2017   VDRL, CSF      Non-reactive Reactive (A)   VDRL Quantitative       1:2 (A)       Social hx - works in surgery here as a surgery tech; drink - social - 2 a week. Smoke - none. Hobbies - none; no partner; lives with grandmother.  Pets - dog (7).   Family hx - dad - not much; mom - scleroderma; no cancer; no diabetes; HTN - yes; heart dx - yes    Past medical hx: ankle surgery.        Review of Systems   Constitution: Negative for chills, decreased appetite, fever, weakness, malaise/fatigue, night sweats, weight gain and weight loss.   HENT: Negative for congestion, ear pain, hearing loss, hoarse voice, sore throat and tinnitus.    Eyes: Negative for blurred vision, redness and visual disturbance.   Cardiovascular: Negative for chest pain, leg swelling and palpitations.   Respiratory: Negative for cough, hemoptysis, shortness of breath and sputum production.    Hematologic/Lymphatic: Negative for adenopathy. Does not bruise/bleed easily.   Skin: Negative for dry skin, itching, rash and suspicious lesions.   Musculoskeletal: Negative for back pain, joint pain, myalgias and neck pain.   Gastrointestinal: Negative for abdominal pain, constipation, diarrhea, heartburn, nausea and vomiting.   Genitourinary: Negative for dysuria, flank pain, frequency, hematuria, hesitancy and urgency.   Neurological: Negative for dizziness, headaches, numbness and paresthesias.   Psychiatric/Behavioral: Negative for depression and memory loss. The patient does not have insomnia and is not nervous/anxious.      Objective:   Physical Exam   Constitutional: He is oriented to  person, place, and time. He appears well-developed and well-nourished.   HENT:   Head: Normocephalic and atraumatic.   Right Ear: External ear normal.   Left Ear: External ear normal.   Mouth/Throat: Oropharynx is clear and moist.   Eyes: Conjunctivae and EOM are normal. Pupils are equal, round, and reactive to light.   Neck: Normal range of motion. Neck supple. No thyromegaly present.   Cardiovascular: Normal rate, regular rhythm and normal heart sounds.    No murmur heard.  Pulmonary/Chest: Effort normal and breath sounds normal. He has no wheezes. He has no rales.   Abdominal: Soft. Bowel sounds are normal. He exhibits no mass. There is no tenderness. There is no rebound.   Musculoskeletal: Normal range of motion.   Lymphadenopathy:     He has no cervical adenopathy.   Neurological: He is alert and oriented to person, place, and time.   Skin: Skin is warm and dry.   Skin lesions drying and the one by lower lip almost resolved.    Psychiatric: He has a normal mood and affect. His behavior is normal.   Vitals reviewed.    Assessment:       1. AIDS (acquired immunodeficiency syndrome), CD4 <=200    2. Hepatitis C antibody positive in blood    3. Neurosyphilis    4. Elevated LFTs    5. Skin lesions          Plan:       1. Genvoya one a day.  2.  Continue Bactrim.  3.  Labs and u/s for hepatitis C.   4. Applying for FMLA due to medical issues.  5. Needs vaccines (menactra, hep A, and hep B).   6. Anal pap in upcoming visit.   7. Needs yearly opth exam.

## 2018-01-08 NOTE — PATIENT INSTRUCTIONS
Viral load in 6 weeks after starting Genvoya.    Patient may return to work as of January 2nd, 2018 and as of January 8th with no restrictions.

## 2018-01-09 ENCOUNTER — TELEPHONE (OUTPATIENT)
Dept: PHARMACY | Facility: CLINIC | Age: 38
End: 2018-01-09

## 2018-01-09 ENCOUNTER — LAB VISIT (OUTPATIENT)
Dept: LAB | Facility: HOSPITAL | Age: 38
End: 2018-01-09
Attending: INTERNAL MEDICINE
Payer: COMMERCIAL

## 2018-01-09 DIAGNOSIS — R76.8 HEPATITIS C ANTIBODY POSITIVE IN BLOOD: ICD-10-CM

## 2018-01-09 LAB — AFP SERPL-MCNC: 1.7 NG/ML

## 2018-01-09 PROCEDURE — 82105 ALPHA-FETOPROTEIN SERUM: CPT

## 2018-01-09 PROCEDURE — 82247 BILIRUBIN TOTAL: CPT

## 2018-01-09 PROCEDURE — 36415 COLL VENOUS BLD VENIPUNCTURE: CPT

## 2018-01-09 PROCEDURE — 86704 HEP B CORE ANTIBODY TOTAL: CPT

## 2018-01-09 PROCEDURE — 87522 HEPATITIS C REVRS TRNSCRPJ: CPT

## 2018-01-09 PROCEDURE — 86790 VIRUS ANTIBODY NOS: CPT

## 2018-01-09 PROCEDURE — 86706 HEP B SURFACE ANTIBODY: CPT

## 2018-01-10 ENCOUNTER — TELEPHONE (OUTPATIENT)
Dept: PHARMACY | Facility: CLINIC | Age: 38
End: 2018-01-10

## 2018-01-10 LAB
FUNGUS BLD CULT: NORMAL
FUNGUS SPEC CULT: NORMAL
HBV CORE AB SERPL QL IA: POSITIVE
HBV SURFACE AB SER-ACNC: POSITIVE M[IU]/ML
HEPATITIS A ANTIBODY, IGG: POSITIVE

## 2018-01-10 NOTE — TELEPHONE ENCOUNTER
Initial Genvoya consult completed on 1/10/18. Patient will  Genvoya on 1/10/18. $0 copay. Address confirmed.  Confirmed 2 patient identifiers - name and . Therapy Appropriate.  Patient will start Genvoya on 18.    Genvoya [phone]- Take one tablet by mouth once daily WITH FOOD.  Counseling was reviewed:   1. Patient MUST take Genvoya at the SAME time every day with food.   2. Side effects include, but not limited to: fatigue/weakness, insomnia, upset stomach, diarrhea, and headaches.   Contact MD if any signs of allergic reaction, kidney problems (weight gain, urine changes, blood in urine), liver problems (dark urine/light stools, jaundice, abdominal pains), depression, bone pain, muscle pain/weakness, change in body fat, and signs of infection.   4. Medication list reviewed. No DDIs or allergies noted. Patient MUST contact myself or provider prior to starting any new OTC, herbal, or prescription drugs to avoid potential DDIs.    -Avoid alcohol   -Space antacids 2 hours before or after Genvoya    This drug does not stop the spread of diseases like HIV that are passed through blood or having sex. Do not have any kind of sex without using a latex or polyurethane condom. Do not share needles or other things like toothbrushes or razors. Talk with your doctor.  Tell your doctor if you are pregnant or plan on getting pregnant. You will need to talk about the benefits and risks of using this drug while you are pregnant.    Discussed the importance of staying well hydrated while on therapy. Compliance stressed - patient to take missed doses as soon as remembered, but NOT to take 2 doses in one day. Patient will report questions or concerns to myself or practitioner. Patient verbalizes understanding. Patient plans to start Genvoya on 18. Consultation included: indication; goals of treatment; administration; storage and handling; side effects; how to handle side effects; the importance of  compliance; how to handle missed doses; the importance of laboratory monitoring; the importance of keeping all follow up appointments.  Patient understands to report any medication changes to [facility] and provider. All questions answered and addressed to patients satisfaction. I will f/u with her in 1 week from start, [facility] to contact patient in 3 weeks for refills.

## 2018-01-11 LAB
HCV LOG: <1.08 LOG (10) IU/ML
HCV RNA QUANT PCR: <12 IU/ML
HCV, QUALITATIVE: NOT DETECTED IU/ML

## 2018-01-12 LAB
A2 MACROGLOB SERPL-MCNC: 140 MG/DL (ref 106–279)
ALT SERPL W P-5'-P-CCNC: 21 U/L (ref 9–46)
APO A-I SERPL-MCNC: 128 MG/DL (ref 94–176)
BILIRUB SERPL-MCNC: 0.3 MG/DL (ref 0.2–1.2)
FIBROSIS STAGE SERPL QL: ABNORMAL
FIBROTEST INTERPRETATION: ABNORMAL
FOOTNOTE: ABNORMAL
GGT SERPL-CCNC: 47 U/L (ref 3–90)
HAPTOGLOB SERPL-MCNC: 268 MG/DL (ref 43–212)
LIVER FIBR SCORE SERPL CALC.FIBROSURE: 0.07
NECROINFLAMMAT INTERP: ABNORMAL
NECROINFLAMMATORY ACT GRADE SERPL QL: ABNORMAL
NECROINFLAMMATORY ACT SCORE SERPL: 0.06
REFERENCE ID: ABNORMAL

## 2018-01-15 ENCOUNTER — HOSPITAL ENCOUNTER (OUTPATIENT)
Dept: RADIOLOGY | Facility: HOSPITAL | Age: 38
Discharge: HOME | End: 2018-01-15
Attending: INTERNAL MEDICINE
Payer: COMMERCIAL

## 2018-01-15 DIAGNOSIS — R76.8 HEPATITIS C ANTIBODY POSITIVE IN BLOOD: ICD-10-CM

## 2018-01-15 PROCEDURE — 76705 ECHO EXAM OF ABDOMEN: CPT | Mod: 26,,, | Performed by: RADIOLOGY

## 2018-01-15 PROCEDURE — 76705 ECHO EXAM OF ABDOMEN: CPT | Mod: TC

## 2018-01-18 ENCOUNTER — TELEPHONE (OUTPATIENT)
Dept: PHARMACY | Facility: CLINIC | Age: 38
End: 2018-01-18

## 2018-01-18 NOTE — TELEPHONE ENCOUNTER
Called patient for initial clinicial f/u - no answer at home or mobile - [facility] for call back.     Keyshawn Mendoza, MANA.Ph.  Clinical Pharmacist  [facility]  Phone: [phone]

## 2018-01-19 NOTE — TELEPHONE ENCOUNTER
Initial clinical follow-up conducted for Genvoya. Name/ confirmed. no missed doses; no new medications; no side effects noted. Patient understands to report any medication changes to [facility] and provider. All questions answered and addressed to patients satisfaction.      MANA Delgado.Ph.  Clinical Pharmacist  [facility]  Phone: [phone]

## 2018-01-24 ENCOUNTER — TELEPHONE (OUTPATIENT)
Dept: PHARMACY | Facility: CLINIC | Age: 38
End: 2018-01-24

## 2018-01-24 NOTE — TELEPHONE ENCOUNTER
Notification received that patient is currently incarcerated and medication, f/u and care will be provided by [facility].  No details given on approximate length of incarceration.  Note that patient will obviously not be available for f/u's, appts, labs.     MANA Delgado.Ph.  Clinical Pharmacist  [facility]  Phone: [phone]

## 2018-02-05 ENCOUNTER — TELEPHONE (OUTPATIENT)
Dept: PHARMACY | Facility: CLINIC | Age: 38
End: 2018-02-05

## 2018-02-13 LAB
ACID FAST MOD KINY STN SPEC: NORMAL
MYCOBACTERIUM SPEC QL CULT: NORMAL

## 2018-02-19 ENCOUNTER — LAB VISIT (OUTPATIENT)
Dept: LAB | Facility: HOSPITAL | Age: 38
End: 2018-02-19
Attending: INTERNAL MEDICINE
Payer: COMMERCIAL

## 2018-02-19 DIAGNOSIS — R76.8 HEPATITIS C ANTIBODY POSITIVE IN BLOOD: ICD-10-CM

## 2018-02-19 PROCEDURE — 87522 HEPATITIS C REVRS TRNSCRPJ: CPT

## 2018-02-19 PROCEDURE — 36415 COLL VENOUS BLD VENIPUNCTURE: CPT

## 2018-02-21 LAB
HCV GENTYP SERPL NAA+PROBE: NORMAL
HCV QUALITATIVE RESULT: NOT DETECTED
HCV QUANTITATIVE LOG: <1.08 LOG (10) IU/ML
HCV RNA SPEC NAA+PROBE-ACNC: <12 IU/ML

## 2018-03-02 ENCOUNTER — TELEPHONE (OUTPATIENT)
Dept: PHARMACY | Facility: CLINIC | Age: 38
End: 2018-03-02

## 2018-03-05 ENCOUNTER — LAB VISIT (OUTPATIENT)
Dept: LAB | Facility: HOSPITAL | Age: 38
End: 2018-03-05
Attending: INTERNAL MEDICINE
Payer: COMMERCIAL

## 2018-03-05 ENCOUNTER — OFFICE VISIT (OUTPATIENT)
Dept: INFECTIOUS DISEASES | Facility: CLINIC | Age: 38
End: 2018-03-05
Payer: COMMERCIAL

## 2018-03-05 ENCOUNTER — TELEPHONE (OUTPATIENT)
Dept: INFECTIOUS DISEASES | Facility: CLINIC | Age: 38
End: 2018-03-05

## 2018-03-05 VITALS
TEMPERATURE: 98 F | HEART RATE: 93 BPM | WEIGHT: 189.38 LBS | DIASTOLIC BLOOD PRESSURE: 87 MMHG | BODY MASS INDEX: 27.11 KG/M2 | HEIGHT: 70 IN | SYSTOLIC BLOOD PRESSURE: 133 MMHG

## 2018-03-05 DIAGNOSIS — Z86.19 HISTORY OF HEPATITIS B: ICD-10-CM

## 2018-03-05 DIAGNOSIS — Z21 HIV POSITIVE: Primary | ICD-10-CM

## 2018-03-05 DIAGNOSIS — B20 HIV DISEASE: ICD-10-CM

## 2018-03-05 DIAGNOSIS — Z86.19 HISTORY OF HEPATITIS C: ICD-10-CM

## 2018-03-05 LAB
ANION GAP SERPL CALC-SCNC: 8 MMOL/L
BUN SERPL-MCNC: 20 MG/DL
CALCIUM SERPL-MCNC: 9.4 MG/DL
CHLORIDE SERPL-SCNC: 100 MMOL/L
CO2 SERPL-SCNC: 29 MMOL/L
CREAT SERPL-MCNC: 1.2 MG/DL
EST. GFR  (AFRICAN AMERICAN): >60 ML/MIN/1.73 M^2
EST. GFR  (NON AFRICAN AMERICAN): >60 ML/MIN/1.73 M^2
GLUCOSE SERPL-MCNC: 88 MG/DL
HBV SURFACE AG SERPL QL IA: NEGATIVE
POTASSIUM SERPL-SCNC: 3.9 MMOL/L
SODIUM SERPL-SCNC: 137 MMOL/L

## 2018-03-05 PROCEDURE — 99999 PR PBB SHADOW E&M-EST. PATIENT-LVL III: CPT | Mod: PBBFAC,,, | Performed by: INTERNAL MEDICINE

## 2018-03-05 PROCEDURE — 99214 OFFICE O/P EST MOD 30 MIN: CPT | Mod: S$GLB,,, | Performed by: INTERNAL MEDICINE

## 2018-03-05 PROCEDURE — 80048 BASIC METABOLIC PNL TOTAL CA: CPT

## 2018-03-05 PROCEDURE — 36415 COLL VENOUS BLD VENIPUNCTURE: CPT

## 2018-03-05 PROCEDURE — 87536 HIV-1 QUANT&REVRSE TRNSCRPJ: CPT

## 2018-03-05 PROCEDURE — 87340 HEPATITIS B SURFACE AG IA: CPT

## 2018-03-05 NOTE — PROGRESS NOTES
Subjective:      Patient ID: Herson Chavez is a 37 y.o. male.    Chief Complaint:Follow-up      History of Present Illness    1. Newly diagnosed HIV - HIV test positive 4/2016; Atripla 10/16 thru 7/2017; last negative test ~ 2009.   - no drugs   - sex with men   - no partner   - sexually active - none at present   - tolerated Atripla ok but did not contnue   - syphilis - treated with IV PCN x 14 days   - on his last visit, he was started on Genvoya and he is tolerating well; reports adherence.    Component      Latest Ref Rng & Units 12/11/2017 10/26/2016   CD4 % [place] T Cell      28 - 57 % 10.6 (L) 13.3 (L)   Absolute CD4      300 - 1400 cells/ul 164 (L) 265 (L)     Component      Latest Ref Rng & Units 12/15/2017 10/25/2016   HIV-1 RNA      Not detected DETECTED (A) DETECTED (A)   HIV 1 RNA Ultra      <40 Copies/mL 102,737 (H) 76,966 (H)   Log HIV Copies/mL      <1.60 Log (10) Copies/mL 5.01 (H) 4.89 (H)   HIV UQ Date Received       12/17/17 10/26/16   HIV UQ Date Reported       12/18/17 10/27/16     Antiretroviral drugs      Resistance  Mutations Detected   Predicted   ___________________________________________________________   NRTIs                             ZDV (zidovudine or Retrovir)      NO       ABC (abacavir or Ziagen)          NO       ddI (didanosine or Videx)         NO       3TC (lamivudine or Epivir)        NO       FTC (emtricitabine or Emtriva)    NO       d4T (stavudine or Zerit)          NO       TDF (tenofovir or Viread)         NO       ________________________________ ___ ______________________   NNRTIs                           ETR (etravirine or Intelence)     NO       EFV (efavirenz or Sustiva)        NO       NVP (nevirapine or Viramune)      NO       RPV (rilpivirine or Edurant)      NO       ________________________________ ___ ______________________   PIs                               FPV (fos-amprenavir or Lexiva)    NO       IDV (indinavir or Crixivan)       NO       NFV  (nelfinavir or Viracept)      NO       SQV (saquinavir or Invirase)      NO       LPV (lopinavir or Kaletra)        NO       ATV (atazanavir or Reyataz)       NO       TPV (tipranavir or Aptivus)       NO       DRV (darunavir or Prezista)       NO      Immunizations     Name Date   Influenza - Quadrivalent - PF 12/07/17   : Sanofi Pasteur   Influenza - Quadrivalent - PF 10/29/16   : Sanofi Pasteur   Pneumococcal Conjugate - 13 Valent 12/27/17   : [employer]   TDAP 10/25/16   : Sanofi Pasteur     2. Syphilis - completed 14 days of PCN. All skin lesions resolved.     Component      Latest Ref Rng & Units 2/19/2018 1/9/2018   Fibrosis Score        0.07   Fibrosis Stage        F0   FIBROSISINTP        SEE BELOW   Necroinflammat Activity Score        0.06   Necroinflammat Activity Grade        A0   Necroinflammat Interp        SEE BELOW   Alpha 2-Macroglobulins, Qn      106 - 279 mg/dL  140   Haptoglobin      43 - 212 mg/dL  268 (H)   Apolipoprotein A-1      94 - 176 mg/dL  128   BILIRUBIN, TOTAL      0.2 - 1.2 mg/dL  0.3   GGT      3 - 90 U/L  47   ALT (SGPT)      9 - 46 U/L  21   REFERENCE ID        [id number]   FOOTNOTE        SEE BELOW   HCV Qualitative Result      Not detected Not detected    HCV Quantitative Result      <12 IU/mL <12    HCV Quantitative Log      <1.08 Log (10) IU/mL <1.08    Hepatitis C Virus Genotype       Canceled    HCV Log      <1.08 Log (10) IU/mL  <1.08   HCV, Qualitative      Not detected IU/mL  Not detected   HCV RNA Quant PCR      <12 IU/mL  <12   AFP      0.0 - 8.4 ng/mL  1.7   Hep B S Ab        Positive (A)   Hep B Core Total Ab        Positive (A)   Hepatitis A Antibody IgG        Positive (A)       Review of Systems   Constitution: Negative for chills, decreased appetite, fever, weakness, malaise/fatigue, night sweats, weight gain and weight loss.   HENT: Negative for congestion, ear pain, hearing loss, hoarse voice, sore throat and  tinnitus.    Eyes: Negative for blurred vision, redness and visual disturbance.   Cardiovascular: Negative for chest pain, leg swelling and palpitations.   Respiratory: Negative for cough, hemoptysis, shortness of breath and sputum production.    Hematologic/Lymphatic: Negative for adenopathy. Does not bruise/bleed easily.   Skin: Negative for dry skin, itching, rash and suspicious lesions.   Musculoskeletal: Negative for back pain, joint pain, myalgias and neck pain.   Gastrointestinal: Negative for abdominal pain, constipation, diarrhea, heartburn, nausea and vomiting.   Genitourinary: Negative for dysuria, flank pain, frequency, hematuria, hesitancy and urgency.   Neurological: Negative for dizziness, headaches, numbness and paresthesias.   Psychiatric/Behavioral: Negative for depression and memory loss. The patient does not have insomnia and is not nervous/anxious.      Objective:   Physical Exam   Constitutional: He is oriented to person, place, and time. He appears well-developed and well-nourished.   HENT:   Head: Normocephalic and atraumatic.   Mouth/Throat: Oropharynx is clear and moist.   Eyes: Conjunctivae and EOM are normal. Pupils are equal, round, and reactive to light.   Neck: Normal range of motion. Neck supple. No thyromegaly present.   Cardiovascular: Normal rate, regular rhythm and normal heart sounds.    No murmur heard.  Pulmonary/Chest: Effort normal and breath sounds normal. He has no wheezes. He has no rales.   Abdominal: Soft. Bowel sounds are normal. He exhibits no mass. There is no tenderness. There is no rebound.   Musculoskeletal: Normal range of motion.   Lymphadenopathy:     He has no cervical adenopathy.   Neurological: He is alert and oriented to person, place, and time.   Skin: Skin is warm and dry.   Psychiatric: He has a normal mood and affect. His behavior is normal.   Vitals reviewed.    Assessment:       1. HIV disease    2. History of hepatitis C    3. History of hepatitis B           Plan:       1. Since hep C viral load is undetectable, no further work up.  2. Tolerating Genvoya well - continue and will get viral load today.  3. Will schedule follow up after reviewing labs.  4. No vaccines at this time.

## 2018-03-06 PROBLEM — Z86.19 HISTORY OF HEPATITIS B: Status: ACTIVE | Noted: 2018-03-06

## 2018-03-06 PROBLEM — Z86.19 HISTORY OF HEPATITIS C: Status: ACTIVE | Noted: 2018-03-06

## 2018-03-07 LAB
HIV UQ DATE RECEIVED: ABNORMAL
HIV UQ DATE REPORTED: ABNORMAL
HIV1 RNA # SERPL NAA+PROBE: 53 COPIES/ML
HIV1 RNA SERPL NAA+PROBE-LOG#: 1.72 LOG (10) COPIES/ML
HIV1 RNA SERPL QL NAA+PROBE: DETECTED

## 2018-03-26 ENCOUNTER — TELEPHONE (OUTPATIENT)
Dept: PHARMACY | Facility: CLINIC | Age: 38
End: 2018-03-26

## 2018-04-20 ENCOUNTER — TELEPHONE (OUTPATIENT)
Dept: PHARMACY | Facility: CLINIC | Age: 38
End: 2018-04-20

## 2018-04-20 NOTE — TELEPHONE ENCOUNTER
Refill readiness for Genvoya confirmed with patient; name/ confirmed; no missed doses; no new medications; no side effects noted; address confirmed for  shipment and  delivery. Patient will call back with any questions or concerns. Co-pay $0.00 - 004.    Olvin Jo, PharmD  Clinical Pharmacist  [facility]  [phone]

## 2018-05-08 ENCOUNTER — LAB VISIT (OUTPATIENT)
Dept: LAB | Facility: HOSPITAL | Age: 38
End: 2018-05-08
Attending: INTERNAL MEDICINE
Payer: COMMERCIAL

## 2018-05-08 DIAGNOSIS — Z21 HIV POSITIVE: ICD-10-CM

## 2018-05-08 LAB
ALBUMIN SERPL BCP-MCNC: 3.7 G/DL
ALP SERPL-CCNC: 133 U/L
ALT SERPL W/O P-5'-P-CCNC: 13 U/L
ANION GAP SERPL CALC-SCNC: 10 MMOL/L
AST SERPL-CCNC: 26 U/L
BASOPHILS # BLD AUTO: 0.02 K/UL
BASOPHILS NFR BLD: 0.4 %
BILIRUB SERPL-MCNC: 0.8 MG/DL
BUN SERPL-MCNC: 15 MG/DL
CALCIUM SERPL-MCNC: 9.4 MG/DL
CHLORIDE SERPL-SCNC: 104 MMOL/L
CO2 SERPL-SCNC: 26 MMOL/L
CREAT SERPL-MCNC: 1.5 MG/DL
DIFFERENTIAL METHOD: ABNORMAL
EOSINOPHIL # BLD AUTO: 0 K/UL
EOSINOPHIL NFR BLD: 0.8 %
ERYTHROCYTE [DISTWIDTH] IN BLOOD BY AUTOMATED COUNT: 13.4 %
EST. GFR  (AFRICAN AMERICAN): >60 ML/MIN/1.73 M^2
EST. GFR  (NON AFRICAN AMERICAN): 57.8 ML/MIN/1.73 M^2
GLUCOSE SERPL-MCNC: 89 MG/DL
HCT VFR BLD AUTO: 41.6 %
HGB BLD-MCNC: 13.8 G/DL
IMM GRANULOCYTES # BLD AUTO: 0.02 K/UL
IMM GRANULOCYTES NFR BLD AUTO: 0.4 %
LYMPHOCYTES # BLD AUTO: 2.4 K/UL
LYMPHOCYTES NFR BLD: 49.2 %
MCH RBC QN AUTO: 34.8 PG
MCHC RBC AUTO-ENTMCNC: 33.2 G/DL
MCV RBC AUTO: 105 FL
MONOCYTES # BLD AUTO: 0.5 K/UL
MONOCYTES NFR BLD: 9.3 %
NEUTROPHILS # BLD AUTO: 1.9 K/UL
NEUTROPHILS NFR BLD: 39.9 %
NRBC BLD-RTO: 0 /100 WBC
PLATELET # BLD AUTO: 217 K/UL
PMV BLD AUTO: 10.1 FL
POTASSIUM SERPL-SCNC: 3.9 MMOL/L
PROT SERPL-MCNC: 9.1 G/DL
RBC # BLD AUTO: 3.97 M/UL
SODIUM SERPL-SCNC: 140 MMOL/L
WBC # BLD AUTO: 4.82 K/UL

## 2018-05-08 PROCEDURE — 86361 T CELL ABSOLUTE COUNT: CPT

## 2018-05-08 PROCEDURE — 85025 COMPLETE CBC W/AUTO DIFF WBC: CPT

## 2018-05-08 PROCEDURE — 80053 COMPREHEN METABOLIC PANEL: CPT

## 2018-05-08 PROCEDURE — 87536 HIV-1 QUANT&REVRSE TRNSCRPJ: CPT

## 2018-05-09 LAB
CD3+CD4+ CELLS # BLD: 341 CELLS/UL (ref 300–1400)
CD3+CD4+ CELLS NFR BLD: 16.6 % (ref 28–57)

## 2018-05-10 LAB
HIV UQ DATE RECEIVED: ABNORMAL
HIV UQ DATE REPORTED: ABNORMAL
HIV1 RNA # SERPL NAA+PROBE: <40 COPIES/ML
HIV1 RNA SERPL NAA+PROBE-LOG#: <1.6 LOG (10) COPIES/ML
HIV1 RNA SERPL QL NAA+PROBE: DETECTED

## 2018-05-14 ENCOUNTER — CLINICAL SUPPORT (OUTPATIENT)
Dept: INFECTIOUS DISEASES | Facility: CLINIC | Age: 38
End: 2018-05-14
Payer: COMMERCIAL

## 2018-05-14 ENCOUNTER — LAB VISIT (OUTPATIENT)
Dept: LAB | Facility: HOSPITAL | Age: 38
End: 2018-05-14
Attending: INTERNAL MEDICINE
Payer: COMMERCIAL

## 2018-05-14 ENCOUNTER — OFFICE VISIT (OUTPATIENT)
Dept: INFECTIOUS DISEASES | Facility: CLINIC | Age: 38
End: 2018-05-14
Payer: COMMERCIAL

## 2018-05-14 VITALS
WEIGHT: 199.31 LBS | DIASTOLIC BLOOD PRESSURE: 82 MMHG | SYSTOLIC BLOOD PRESSURE: 116 MMHG | HEIGHT: 70 IN | HEART RATE: 83 BPM | TEMPERATURE: 98 F | BODY MASS INDEX: 28.53 KG/M2

## 2018-05-14 DIAGNOSIS — B20 HIV DISEASE: ICD-10-CM

## 2018-05-14 DIAGNOSIS — R79.89 ELEVATED LFTS: ICD-10-CM

## 2018-05-14 DIAGNOSIS — Z86.19 HISTORY OF HEPATITIS B: ICD-10-CM

## 2018-05-14 DIAGNOSIS — B20 HIV DISEASE: Primary | ICD-10-CM

## 2018-05-14 DIAGNOSIS — Z86.19 HISTORY OF HEPATITIS C: ICD-10-CM

## 2018-05-14 DIAGNOSIS — R77.8 ELEVATED TOTAL PROTEIN: ICD-10-CM

## 2018-05-14 LAB
25(OH)D3+25(OH)D2 SERPL-MCNC: 16 NG/ML
ANION GAP SERPL CALC-SCNC: 7 MMOL/L
BUN SERPL-MCNC: 16 MG/DL
CALCIUM SERPL-MCNC: 9.6 MG/DL
CHLORIDE SERPL-SCNC: 103 MMOL/L
CO2 SERPL-SCNC: 28 MMOL/L
CREAT SERPL-MCNC: 1.2 MG/DL
EST. GFR  (AFRICAN AMERICAN): >60 ML/MIN/1.73 M^2
EST. GFR  (NON AFRICAN AMERICAN): >60 ML/MIN/1.73 M^2
GLUCOSE SERPL-MCNC: 106 MG/DL
POTASSIUM SERPL-SCNC: 4 MMOL/L
SODIUM SERPL-SCNC: 138 MMOL/L

## 2018-05-14 PROCEDURE — 84165 PROTEIN E-PHORESIS SERUM: CPT | Mod: 26,,, | Performed by: PATHOLOGY

## 2018-05-14 PROCEDURE — 90472 IMMUNIZATION ADMIN EACH ADD: CPT | Mod: S$GLB,,, | Performed by: INTERNAL MEDICINE

## 2018-05-14 PROCEDURE — 36415 COLL VENOUS BLD VENIPUNCTURE: CPT

## 2018-05-14 PROCEDURE — 84165 PROTEIN E-PHORESIS SERUM: CPT

## 2018-05-14 PROCEDURE — 86334 IMMUNOFIX E-PHORESIS SERUM: CPT

## 2018-05-14 PROCEDURE — 80048 BASIC METABOLIC PNL TOTAL CA: CPT

## 2018-05-14 PROCEDURE — 90734 MENACWYD/MENACWYCRM VACC IM: CPT | Mod: S$GLB,,, | Performed by: INTERNAL MEDICINE

## 2018-05-14 PROCEDURE — 3008F BODY MASS INDEX DOCD: CPT | Mod: CPTII,S$GLB,, | Performed by: INTERNAL MEDICINE

## 2018-05-14 PROCEDURE — 99214 OFFICE O/P EST MOD 30 MIN: CPT | Mod: S$GLB,,, | Performed by: INTERNAL MEDICINE

## 2018-05-14 PROCEDURE — 90471 IMMUNIZATION ADMIN: CPT | Mod: S$GLB,,, | Performed by: INTERNAL MEDICINE

## 2018-05-14 PROCEDURE — 99999 PR PBB SHADOW E&M-EST. PATIENT-LVL III: CPT | Mod: PBBFAC,,, | Performed by: INTERNAL MEDICINE

## 2018-05-14 PROCEDURE — 90632 HEPA VACCINE ADULT IM: CPT | Mod: S$GLB,,, | Performed by: INTERNAL MEDICINE

## 2018-05-14 PROCEDURE — 86334 IMMUNOFIX E-PHORESIS SERUM: CPT | Mod: 26,,, | Performed by: PATHOLOGY

## 2018-05-14 PROCEDURE — 82306 VITAMIN D 25 HYDROXY: CPT

## 2018-05-14 RX ORDER — SULFAMETHOXAZOLE AND TRIMETHOPRIM 800; 160 MG/1; MG/1
1 TABLET ORAL DAILY
Qty: 30 TABLET | Refills: 6 | Status: SHIPPED | OUTPATIENT
Start: 2018-05-14 | End: 2019-05-31 | Stop reason: SDUPTHER

## 2018-05-14 NOTE — PROGRESS NOTES
Subjective:      Patient ID: Herson Chavez is a 37 y.o. male.    Chief Complaint:Follow-up      History of Present Illness    1. Newly diagnosed HIV - HIV test positive 4/2016; Atripla 10/16 thru 7/2017; last negative test ~ 2009.   - no drugs   - sex with men   - no partner   - sexually active - none at present   - tolerated Atripla ok but did not contnue   - syphilis - treated with IV PCN x 14 days   - now on Genvoya and he is tolerating well; reports adherence.    Component      Latest Ref Rng & Units 5/8/2018             Creatinine      0.5 - 1.4 mg/dL 1.5 (H)     Component      Latest Ref Rng & Units 5/8/2018             Total Protein      6.0 - 8.4 g/dL 9.1 (H)     Component      Latest Ref Rng & Units 5/8/2018 3/5/2018   HIV-1 RNA, Qual      Not detected DETECTED (A) DETECTED (A)   HIV 1 RNA Ultra      <40 Copies/mL <40 53 (H)   Log HIV Copies/mL      <1.60 Log (10) Copies/mL <1.60 1.72 (H)   HIV UQ Date Received       5/9/18 3/6/18   HIV UQ Date Reported       5/10/18 3/7/18           Review of Systems   Constitution: Negative for chills, decreased appetite, fever, weakness, malaise/fatigue, night sweats, weight gain and weight loss.   HENT: Negative for congestion, ear pain, hearing loss, hoarse voice, sore throat and tinnitus.    Eyes: Negative for blurred vision, redness and visual disturbance.   Cardiovascular: Negative for chest pain, leg swelling and palpitations.   Respiratory: Negative for cough, hemoptysis, shortness of breath and sputum production.    Hematologic/Lymphatic: Negative for adenopathy. Does not bruise/bleed easily.   Skin: Negative for dry skin, itching, rash and suspicious lesions.   Musculoskeletal: Negative for back pain, joint pain, myalgias and neck pain.   Gastrointestinal: Negative for abdominal pain, constipation, diarrhea, heartburn, nausea and vomiting.   Genitourinary: Negative for dysuria, flank pain, frequency, hematuria, hesitancy and urgency.   Neurological:  Negative for dizziness, headaches, numbness and paresthesias.   Psychiatric/Behavioral: Negative for depression and memory loss. The patient does not have insomnia and is not nervous/anxious.      Objective:   Physical Exam   Constitutional: He is oriented to person, place, and time. He appears well-developed and well-nourished.   HENT:   Head: Normocephalic and atraumatic.   Eyes: Conjunctivae and EOM are normal. Pupils are equal, round, and reactive to light.   Neck: Normal range of motion. Neck supple. No thyromegaly present.   Cardiovascular: Normal rate, regular rhythm and normal heart sounds.    No murmur heard.  Pulmonary/Chest: Effort normal and breath sounds normal. He has no wheezes. He has no rales.   Abdominal: Soft. Bowel sounds are normal. He exhibits no mass. There is no tenderness. There is no rebound.   Musculoskeletal: Normal range of motion.   Lymphadenopathy:     He has no cervical adenopathy.   Neurological: He is alert and oriented to person, place, and time.   Skin: Skin is warm and dry.   Psychiatric: He has a normal mood and affect. His behavior is normal.   Vitals reviewed.    Assessment:       1. HIV disease    2. History of hepatitis C    3. History of hepatitis B    4. Elevated LFTs    5. Elevated total protein          Plan:       1. Elevated creatinine may be related to cobicistat - will angela.  2. Elevated protein may be polyclonal and due to HIV itself; however, will ck SPEP.  3. Vaccines: hepatitis A and menactra.  4. Can reduce bactrim to MWF.   5. Otherwise, will follow up in 6 months with labs prior.

## 2018-05-14 NOTE — PROGRESS NOTES
Pt received the Menactra Meningococcal and Hepatitis A vaccinations. Pt tolerated the injections well. Pt left the unit in NAD. Return appt provided.

## 2018-05-15 LAB
ALBUMIN SERPL ELPH-MCNC: 4.45 G/DL
ALPHA1 GLOB SERPL ELPH-MCNC: 0.27 G/DL
ALPHA2 GLOB SERPL ELPH-MCNC: 0.48 G/DL
B-GLOBULIN SERPL ELPH-MCNC: 1.51 G/DL
GAMMA GLOB SERPL ELPH-MCNC: 2.59 G/DL
PROT SERPL-MCNC: 9.3 G/DL

## 2018-05-16 LAB
INTERPRETATION SERPL IFE-IMP: NORMAL
PATHOLOGIST INTERPRETATION IFE: NORMAL
PATHOLOGIST INTERPRETATION SPE: NORMAL

## 2018-05-17 ENCOUNTER — TELEPHONE (OUTPATIENT)
Dept: PHARMACY | Facility: CLINIC | Age: 38
End: 2018-05-17

## 2018-05-17 NOTE — TELEPHONE ENCOUNTER
Patient called for refill readiness and follow up on Genvoya.  No answer - lvm for call back.     Keyshawn Mendoza, MANA.Ph.  Clinical Pharmacist  [facility]  Phone: [phone]

## 2018-05-17 NOTE — TELEPHONE ENCOUNTER
Refill readiness for Genvoya confirmed with patient; name/ confirmed; no missed doses; no new medications; no side effects noted; address confirmed for  shipment and  delivery    Initial clinical follow-up conducted for Genvoya. Name/ confirmed. no missed doses; no new medications; no side effects noted. Patient understands to report any medication changes to [facility] and provider. All questions answered and addressed to patients satisfaction.      MANA Delgado.Ph.  Clinical Pharmacist  [facility]  Phone: [phone]

## 2018-05-20 PROBLEM — R77.8 ELEVATED TOTAL PROTEIN: Status: ACTIVE | Noted: 2018-05-20

## 2018-06-12 ENCOUNTER — TELEPHONE (OUTPATIENT)
Dept: PHARMACY | Facility: CLINIC | Age: 38
End: 2018-06-12

## 2018-06-15 ENCOUNTER — TELEPHONE (OUTPATIENT)
Dept: PHARMACY | Facility: CLINIC | Age: 38
End: 2018-06-15

## 2018-06-19 ENCOUNTER — OFFICE VISIT (OUTPATIENT)
Dept: OPTOMETRY | Facility: CLINIC | Age: 38
End: 2018-06-19
Payer: COMMERCIAL

## 2018-06-19 DIAGNOSIS — H52.13 MYOPIA, BILATERAL: Primary | ICD-10-CM

## 2018-06-19 DIAGNOSIS — Z83.511 FAMILY HISTORY OF GLAUCOMA IN GRANDMOTHER: ICD-10-CM

## 2018-06-19 DIAGNOSIS — Z04.9 DISEASE RULED OUT AFTER EXAMINATION: ICD-10-CM

## 2018-06-19 DIAGNOSIS — B20 AIDS (ACQUIRED IMMUNODEFICIENCY SYNDROME), CD4 <=200: ICD-10-CM

## 2018-06-19 PROCEDURE — 92004 COMPRE OPH EXAM NEW PT 1/>: CPT | Mod: S$GLB,,, | Performed by: OPTOMETRIST

## 2018-06-19 PROCEDURE — 92015 DETERMINE REFRACTIVE STATE: CPT | Mod: S$GLB,,, | Performed by: OPTOMETRIST

## 2018-06-19 PROCEDURE — 99999 PR PBB SHADOW E&M-EST. PATIENT-LVL I: CPT | Mod: PBBFAC,,, | Performed by: OPTOMETRIST

## 2018-06-19 NOTE — PROGRESS NOTES
HPI     Last eye exam 2016  Here for complete eye exam  Occasional flashes, and floaters  No itching burning or tearing  No diplopia    Last edited by Bhavna Robles on 6/19/2018  2:02 PM. (History)            Assessment /Plan     For exam results, see Encounter Report.    Myopia, bilateral    AIDS (acquired immunodeficiency syndrome), CD4 <=200    Disease ruled out after examination    Family history of glaucoma in grandmother    Rx specs    Good overall ocular health    RTC 1 year, sooner PRN

## 2018-07-23 DIAGNOSIS — Z21 HIV POSITIVE: Primary | ICD-10-CM

## 2018-07-24 ENCOUNTER — TELEPHONE (OUTPATIENT)
Dept: PHARMACY | Facility: CLINIC | Age: 38
End: 2018-07-24

## 2018-08-21 ENCOUNTER — TELEPHONE (OUTPATIENT)
Dept: PHARMACY | Facility: CLINIC | Age: 38
End: 2018-08-21

## 2018-08-21 NOTE — TELEPHONE ENCOUNTER
Genvoya refill confirmed.  Genvoya to be shipped on  and arrive on .   $0 copay- 004. Confirmed 2 patient identifiers - name and .      Patient reports taking Genvoya routinely and has about 15 doses on hand.  No side effects noted.  No missed doses, no new medications, no new allergies or health conditions reported at this time.  Patient denies any recent visits to urgent care or ER.  Patient also denies any issues with depression.  All questions answered and addressed to patients satisfaction. Pt verbalized understanding.

## 2018-10-18 ENCOUNTER — TELEPHONE (OUTPATIENT)
Dept: PHARMACY | Facility: CLINIC | Age: 38
End: 2018-10-18

## 2018-11-15 ENCOUNTER — TELEPHONE (OUTPATIENT)
Dept: PHARMACY | Facility: CLINIC | Age: 38
End: 2018-11-15

## 2018-11-15 NOTE — TELEPHONE ENCOUNTER
Refill readiness for Genvoya confirmed with patient; name/ confirmed; no missed doses; no new medications; no side effects noted; address confirmed for  shipment and  delivery.    Clinical follow-up conducted for Genvoya. Name/ confirmed. no missed doses; no new medications; no side effects noted. Patient understands to report any medication changes to [facility] and provider. All questions answered and addressed to patients satisfaction.      Keyshawn Mendoza R.Ph., AAHIVP  Clinical Pharmacist, HIV/HCV  [facility]  Phone: [phone]

## 2018-12-10 ENCOUNTER — TELEPHONE (OUTPATIENT)
Dept: PHARMACY | Facility: CLINIC | Age: 38
End: 2018-12-10

## 2019-01-03 ENCOUNTER — TELEPHONE (OUTPATIENT)
Dept: PHARMACY | Facility: CLINIC | Age: 39
End: 2019-01-03

## 2019-01-14 NOTE — TELEPHONE ENCOUNTER
Notify provider of difficulty in reaching patient.     Keyshawn Mendoza R.Ph., AAHIVP  Clinical Pharmacist, HIV/HCV  [facility]  Phone: [phone]

## 2019-01-17 NOTE — TELEPHONE ENCOUNTER
Patient called to refill Genvoya.  Patient confirmed he has 13 doses left.  Ship 1/22 for 1/23 delivery.  Copay $0.00 in 004.  Patient confirmed no new meds, allergies, or health conditions.  Verified address.  Declined [facility] . [address]

## 2019-02-19 DIAGNOSIS — Z21 HIV POSITIVE: ICD-10-CM

## 2019-02-20 ENCOUNTER — TELEPHONE (OUTPATIENT)
Dept: PHARMACY | Facility: CLINIC | Age: 39
End: 2019-02-20

## 2019-02-20 NOTE — TELEPHONE ENCOUNTER
Patient called for refill readiness and follow up on Genvoya.  No answer - lvm for call back.     Keyshawn Mendoza, MANA.Ph., AAHIVP  Clinical Pharmacist, HIV/HCV  [facility]  Phone: [phone]

## 2019-02-22 NOTE — TELEPHONE ENCOUNTER
Patient called for refill readiness and follow up on Genovya.  No answer - lvm for call back.     Keyshawn Mendoza, MANA.Ph., AAHIVP  Clinical Pharmacist, HIV/HCV  [facility]  Phone: [phone]

## 2019-02-26 NOTE — TELEPHONE ENCOUNTER
Refill readiness for Gevnoya confirmed with patient; name/ confirmed; no missed doses; no new medications; no side effects noted; address confirmed for 3/6 shipment and 3/7 delivery.  Patient states they have 10 doses remaining.     Keyshawn Mendoza, MANA.Ph., AAHIVP  Clinical Pharmacist, HIV/HCV  [facility]  Phone: [phone]

## 2019-03-27 ENCOUNTER — TELEPHONE (OUTPATIENT)
Dept: PHARMACY | Facility: CLINIC | Age: 39
End: 2019-03-27

## 2019-03-27 NOTE — TELEPHONE ENCOUNTER
Patient called for refill readiness and follow up on Genvoya.  No answer - lvm for call back.     Keyshawn Mendoza, MANA.Ph., AAHIVP  Clinical Pharmacist, HIV/HCV  [facility]  Phone: [phone]

## 2019-03-29 NOTE — TELEPHONE ENCOUNTER
Patient called for refill readiness and follow up on Genvoya.  No answer - lvm for call back.     Keyshawn Mendoza, MANA.Ph., AAHIVP  Clinical Pharmacist, HIV/HCV  [facility]  Phone: [phone]

## 2019-03-29 NOTE — TELEPHONE ENCOUNTER
Refill readiness for Genvoya confirmed with patient; name/ confirmed; one missed dose which patient states is due to purely forgetting - daily dosing was resumed the next day; no new medications; no side effects noted; address confirmed for  shipment and 4/3 delivery.  Patient states they have 10 doses remaining.     Clinical follow-up conducted for Genovya. Name/ confirmed. One missed dose as described above; no new medications; no side effects noted. Patient understands to report any medication changes to [facility] and provider. All questions answered and addressed to patients satisfaction.      Keyshawn Mendoza, R.Ph., [facility]  Clinical Pharmacist, HIV/HCV  [facility]  Phone: [phone]

## 2019-04-24 ENCOUNTER — TELEPHONE (OUTPATIENT)
Dept: PHARMACY | Facility: CLINIC | Age: 39
End: 2019-04-24

## 2019-04-24 DIAGNOSIS — Z21 HIV POSITIVE: ICD-10-CM

## 2019-06-04 RX ORDER — SULFAMETHOXAZOLE AND TRIMETHOPRIM 800; 160 MG/1; MG/1
TABLET ORAL
Qty: 30 TABLET | Refills: 4 | Status: SHIPPED | OUTPATIENT
Start: 2019-06-04 | End: 2020-11-19 | Stop reason: ALTCHOICE

## 2019-07-08 ENCOUNTER — TELEPHONE (OUTPATIENT)
Dept: PHARMACY | Facility: CLINIC | Age: 39
End: 2019-07-08

## 2019-07-08 NOTE — TELEPHONE ENCOUNTER
Patient called for refill and follow up on Genvoya - na lvm.     MANA Delgado.Ph., AAHIVP  Clinical Pharmacist, HIV/HCV  [facility]  Phone: [phone]

## 2019-07-10 DIAGNOSIS — Z21 HIV POSITIVE: ICD-10-CM

## 2019-07-10 NOTE — TELEPHONE ENCOUNTER
Patient called for refill readiness and follow up on Genvoya.  No answer - lvm for call back.     Keyshawn Mendoza, MANA.Ph., AAHIVP  Clinical Pharmacist, HIV/HCV  [facility]  Phone: [phone]

## 2019-07-10 NOTE — TELEPHONE ENCOUNTER
Refill readiness for Genvoya confirmed with patient; name/ confirmed; no missed doses; no new medications; no side effects noted; address confirmed for  shipment and  delivery.  Patient states they have 10 doses remaining.     Current rx is nor refillable - refill auth request sent to provider and medication will be shipped per above once approved.     Clinical follow-up conducted for Genvoya. Name/ confirmed. no missed doses; no new medications; no side effects noted. Patient understands to report any medication changes to [facility] and provider. All questions answered and addressed to patients satisfaction.      MANA Delgado.Ph., AAHIVP  Clinical Pharmacist, HIV/HCV  [facility]  Phone: [phone]

## 2019-08-09 ENCOUNTER — TELEPHONE (OUTPATIENT)
Dept: PHARMACY | Facility: CLINIC | Age: 39
End: 2019-08-09

## 2019-08-22 ENCOUNTER — TELEPHONE (OUTPATIENT)
Dept: INFECTIOUS DISEASES | Facility: CLINIC | Age: 39
End: 2019-08-22

## 2019-08-22 NOTE — TELEPHONE ENCOUNTER
Patient had FMLA forms faxed to [facility]. Patient was contacted and told he will need his PCP (Becky Sanders) fill out these forms, per Dr. Morrell. Patient verbalized he understood.

## 2019-08-23 NOTE — TELEPHONE ENCOUNTER
FOR DOCUMENTATION ONLY:    Financial Assistance for Genvoya approved from 08/01/2019 to 12/31/2019    Source: [employer] Card    ID: [id number]  BIN: [id number]  PCN: [id number]  GRP: [id number]

## 2019-09-23 DIAGNOSIS — Z21 HIV POSITIVE: ICD-10-CM

## 2019-09-25 ENCOUNTER — TELEPHONE (OUTPATIENT)
Dept: PHARMACY | Facility: CLINIC | Age: 39
End: 2019-09-25

## 2019-10-28 ENCOUNTER — TELEPHONE (OUTPATIENT)
Dept: PHARMACY | Facility: CLINIC | Age: 39
End: 2019-10-28

## 2019-10-30 NOTE — TELEPHONE ENCOUNTER
Called patient for Genvoya refill and follow up. Patient denies need for refill at this time and states he has about 2 weeks worth of medication remaining. Based on last month's dose count patient should have ~1 week of medication remaining. Patient denies missed doses and would like a call back next week to set up refill. Will follow-up

## 2019-11-05 NOTE — TELEPHONE ENCOUNTER
Patient called for refill readiness and follow up on Genvoya.  No answer - lvm for call back.     Keyshawn Mendoza, MANA.Ph., AAHIVP  Clinical Pharmacist, HIV/HCV  [facility]  Phone: [phone]

## 2019-11-07 NOTE — TELEPHONE ENCOUNTER
Patient called for refill readiness and follow up on Genovya.  No answer - lvm for call back.     Keyshawn Mendoza, MANA.Ph., AAHIVP  Clinical Pharmacist, HIV/HCV  [facility]  Phone: [phone]

## 2019-11-11 NOTE — TELEPHONE ENCOUNTER
Patient called for refill readiness and follow up on Genvoya.  No answer - lvm for call back.     Keyshawn Mendoza, MANA.Ph., AAHIVP  Clinical Pharmacist, HIV/HCV  [facility]  Phone: [phone]

## 2019-11-12 ENCOUNTER — TELEPHONE (OUTPATIENT)
Dept: PHARMACY | Facility: CLINIC | Age: 39
End: 2019-11-12

## 2019-11-12 NOTE — TELEPHONE ENCOUNTER
Refill readiness for Genvoya confirmed with patient; name/ confirmed; pt reports missing a few doses because he simply forgot. encouraged the pt to set an alarm to help him remember. reinforced the importance of adherence; no new medications; no side effects noted; address confirmed for  shipment and  delivery. Patient states they have 9 doses remaining.    Clinical follow-up conducted for Genvoya. Name/ confirmed. no missed doses; no new medications; no side effects noted. Patient understands to report any medication changes to [facility] and provider. All questions answered and addressed to patients satisfaction.

## 2019-12-12 ENCOUNTER — TELEPHONE (OUTPATIENT)
Dept: PHARMACY | Facility: CLINIC | Age: 39
End: 2019-12-12

## 2020-01-14 ENCOUNTER — TELEPHONE (OUTPATIENT)
Dept: PHARMACY | Facility: CLINIC | Age: 40
End: 2020-01-14

## 2020-01-20 ENCOUNTER — TELEPHONE (OUTPATIENT)
Dept: PHARMACY | Facility: CLINIC | Age: 40
End: 2020-01-20

## 2020-01-24 NOTE — TELEPHONE ENCOUNTER
RX call attempt 3 regarding Genvoya from [facility]. Patient was not reached -- left vm. [employer] message was not read.  copay 0.00. Opening Clinical intervention due to failed 3rd call attempt.

## 2020-01-30 NOTE — TELEPHONE ENCOUNTER
Genvoya refill attempted. Patient called back [facility] and states he has 20 tablets on hand. Reviewed adherence. He states he has not missed any days of medication and believes his last on hand count was in correct. He requests a call back in 2 weeks.

## 2020-02-12 ENCOUNTER — TELEPHONE (OUTPATIENT)
Dept: PHARMACY | Facility: CLINIC | Age: 40
End: 2020-02-12

## 2020-02-12 NOTE — TELEPHONE ENCOUNTER
Patient called for refill readiness and follow up on Genvoya.  No answer - lvm for call back.     Keyshawn Mendoza, MANA.Ph., AAHIVP  Clinical Pharmacist, HIV/HCV  [facility]  Phone: [phone]

## 2020-02-14 NOTE — TELEPHONE ENCOUNTER
Patient called for refill readiness and follow up on Genvoya.  No answer - lvm for call back.     Keyshawn Mendoza, MANA.Ph., AAHIVP  Clinical Pharmacist, HIV/HCV  [facility]  Phone: [phone]

## 2020-02-18 ENCOUNTER — PATIENT MESSAGE (OUTPATIENT)
Dept: PHARMACY | Facility: CLINIC | Age: 40
End: 2020-02-18

## 2020-02-20 ENCOUNTER — TELEPHONE (OUTPATIENT)
Dept: PHARMACY | Facility: CLINIC | Age: 40
End: 2020-02-20

## 2020-02-20 NOTE — TELEPHONE ENCOUNTER
Patient called for refill readiness and follow up on Genvoya.  No answer - lvm for call back.     Keyshawn Mendoza, MANA.Ph., AAHIVP  Clinical Pharmacist, HIV/HCV  [facility]  Phone: [phone]

## 2020-02-20 NOTE — TELEPHONE ENCOUNTER
Genvoya refill confirmed and f/u complete. Patient will  Genvoya refill at [facility] on . $0.00 copay-004. Confirmed 2 patient identifiers - name and . Therapy appropriate.     Patient has 5 doses of Genvoya remaining. Pt reports they are not having any side effects so far. No missed doses, no new medications, no new allergies or health conditions reported at this time. Allergies reviewed and medication reconciliation complete (reviewed and documented in [facility] and [facility]). Disease education reviewed (including transmission and prevention). Patient counseled on importance of maintaining adherence and keeping lab appointments which were scheduled. All questions answered and addressed to patients satisfaction. Advised to call [facility] and provider if any issues arise. Pt verbalized understanding.

## 2020-03-19 ENCOUNTER — TELEPHONE (OUTPATIENT)
Dept: PHARMACY | Facility: CLINIC | Age: 40
End: 2020-03-19

## 2020-04-21 DIAGNOSIS — Z01.84 ANTIBODY RESPONSE EXAMINATION: ICD-10-CM

## 2020-04-22 ENCOUNTER — TELEPHONE (OUTPATIENT)
Dept: PHARMACY | Facility: CLINIC | Age: 40
End: 2020-04-22

## 2020-05-21 DIAGNOSIS — Z01.84 ANTIBODY RESPONSE EXAMINATION: ICD-10-CM

## 2020-05-26 ENCOUNTER — TELEPHONE (OUTPATIENT)
Dept: PHARMACY | Facility: CLINIC | Age: 40
End: 2020-05-26

## 2020-06-01 NOTE — TELEPHONE ENCOUNTER
Refill call attempt #2 for Genvoya at [facility]. Copay $0. No answer, lvm and spoke to mother on secondary #, she will have patient call us back.

## 2020-06-20 DIAGNOSIS — Z01.84 ANTIBODY RESPONSE EXAMINATION: ICD-10-CM

## 2020-07-01 DIAGNOSIS — Z21 HIV POSITIVE: ICD-10-CM

## 2020-07-02 ENCOUNTER — TELEPHONE (OUTPATIENT)
Dept: PHARMACY | Facility: CLINIC | Age: 40
End: 2020-07-02

## 2020-07-02 RX ORDER — ELVITEGRAVIR, COBICISTAT, EMTRICITABINE, AND TENOFOVIR ALAFENAMIDE 150; 150; 200; 10 MG/1; MG/1; MG/1; MG/1
1 TABLET ORAL DAILY
Qty: 30 TABLET | Refills: 6 | Status: SHIPPED | OUTPATIENT
Start: 2020-07-02 | End: 2021-03-19 | Stop reason: SDUPTHER

## 2020-07-02 NOTE — TELEPHONE ENCOUNTER
Refill call regarding Genvoya at [facility]. Will prepare for shipment with consent of patient on  to arrive . Copay 0.00. Patient has not started any new medications including OTC drugs. Patient has not had any medication/ dose or instruction changes. No new allergies or side effects reported with this shipment. Medication is being taken as prescribed by physician and properly stored. Two patient identifiers:  and Address verified. Patient has no questions or concerns for [facility]. Patient has 9 days on hand.

## 2020-07-20 DIAGNOSIS — Z01.84 ANTIBODY RESPONSE EXAMINATION: ICD-10-CM

## 2020-07-28 ENCOUNTER — OFFICE VISIT (OUTPATIENT)
Dept: OPTOMETRY | Facility: CLINIC | Age: 40
End: 2020-07-28
Payer: COMMERCIAL

## 2020-07-28 DIAGNOSIS — B20 AIDS (ACQUIRED IMMUNODEFICIENCY SYNDROME), CD4 <=200: ICD-10-CM

## 2020-07-28 DIAGNOSIS — H52.13 MYOPIA OF BOTH EYES: Primary | ICD-10-CM

## 2020-07-28 PROCEDURE — 99999 PR PBB SHADOW E&M-EST. PATIENT-LVL III: CPT | Mod: PBBFAC,,, | Performed by: OPTOMETRIST

## 2020-07-28 PROCEDURE — 92014 COMPRE OPH EXAM EST PT 1/>: CPT | Mod: S$GLB,,, | Performed by: OPTOMETRIST

## 2020-07-28 PROCEDURE — 99999 PR PBB SHADOW E&M-EST. PATIENT-LVL III: ICD-10-PCS | Mod: PBBFAC,,, | Performed by: OPTOMETRIST

## 2020-07-28 PROCEDURE — 92015 DETERMINE REFRACTIVE STATE: CPT | Mod: S$GLB,,, | Performed by: OPTOMETRIST

## 2020-07-28 PROCEDURE — 92014 PR EYE EXAM, EST PATIENT,COMPREHESV: ICD-10-PCS | Mod: S$GLB,,, | Performed by: OPTOMETRIST

## 2020-07-28 PROCEDURE — 92015 PR REFRACTION: ICD-10-PCS | Mod: S$GLB,,, | Performed by: OPTOMETRIST

## 2020-07-28 NOTE — PROGRESS NOTES
HPI     STEVENSON: 06/19/18 Dr. Reynolds   39 y.o. male pt    Pt states that he is here for eye exam and to see if there is change in   prescription.  No problem with vision.  Headaches?  Sometimes with out glasses  Eye pain/discomfort?  ni                                                                                     Flashes?  no  Floaters?  no  Diplopia/Double vision?  no           Any eye surgeries? no         Any eye injury?  no         Any treatment for eye disease?  no  Family history of eye disease?  Glaucoma - Grandmother maturnal    Eye med(s) - None      Last edited by Janet Negrete MA on 7/28/2020  3:47 PM. (History)            Assessment /Plan     For exam results, see Encounter Report.    Myopia of both eyes    AIDS (acquired immunodeficiency syndrome), CD4 <=200      SRx released to patient. Patient educated on lens options. Normal ocular health. RTC 1 year for routine exam.    Pt reports low CD4 but hasn't seen Dr due to COVID-19    Pt works in surgery here.

## 2020-07-31 ENCOUNTER — TELEPHONE (OUTPATIENT)
Dept: PHARMACY | Facility: CLINIC | Age: 40
End: 2020-07-31

## 2020-08-18 NOTE — TELEPHONE ENCOUNTER
Genvoya follow up attempted. (Attempt 2). No answer. LVM for call back. Will f/u with patient if no return call.

## 2020-08-19 DIAGNOSIS — Z01.84 ANTIBODY RESPONSE EXAMINATION: ICD-10-CM

## 2020-08-25 ENCOUNTER — TELEPHONE (OUTPATIENT)
Dept: PHARMACY | Facility: CLINIC | Age: 40
End: 2020-08-25

## 2020-08-25 NOTE — TELEPHONE ENCOUNTER
Call for Genvoya follow up. Patient states that he has had no issues with Genvoya and has had no side effects. Patient states that he usually gets all of his lab work with the [facility] system, but he has not had current lab work for some time. Informed patient that we would message his provider to put lab work in so that this could be assessed. Patient states that he takes his medication at 8:30AM with his breakfast. He reports that he has no pain at this time. He has not missed any work or social activities due to his condition. He has not had to seek urgent care in the past 4 weeks. Patient states that he is comfortable with his therapy. Patient confirms that he is storing his medication that is away from heat, light and humidity. Patient declined disease state education. Patient rates his QoL a 8/10. Patient is stable. No other questions or concerns at this time.     Trey Christianson, PharmD  Clinical Pharmacist  [facility]  P: [phone]

## 2020-08-26 NOTE — TELEPHONE ENCOUNTER
Patient called for refill readiness and follow up on Genovya.  No answer - lvm for call back.     Keyshawn Mendoza, MANA.Ph., AAHIVP  Clinical Pharmacist, HIV/HCV  [facility]  Phone: [phone]       Authored by Resident/PA/NP

## 2020-08-30 ENCOUNTER — TELEPHONE (OUTPATIENT)
Dept: INFECTIOUS DISEASES | Facility: CLINIC | Age: 40
End: 2020-08-30

## 2020-08-30 DIAGNOSIS — B20 HIV DISEASE: Primary | ICD-10-CM

## 2020-08-30 NOTE — TELEPHONE ENCOUNTER
----- Message from Trey Christianson PharmD sent at 8/25/2020 10:33 AM CDT -----  Regarding: HIV lab work  Good morning,    Mr. Chavez has not had lab work completed for some time. Would it be possible to order the pertinent labs to assess his continued Genvoya therapy? Thank you for your time.     Regards,    Trey Christianson PharmD  Clinical Pharmacist  [facility]  P: [phone]

## 2020-09-01 ENCOUNTER — LAB VISIT (OUTPATIENT)
Dept: LAB | Facility: HOSPITAL | Age: 40
End: 2020-09-01
Attending: INTERNAL MEDICINE
Payer: COMMERCIAL

## 2020-09-01 DIAGNOSIS — B20 HIV DISEASE: ICD-10-CM

## 2020-09-01 LAB
ALBUMIN SERPL BCP-MCNC: 4.1 G/DL (ref 3.5–5.2)
ALP SERPL-CCNC: 101 U/L (ref 55–135)
ALT SERPL W/O P-5'-P-CCNC: 14 U/L (ref 10–44)
ANION GAP SERPL CALC-SCNC: 10 MMOL/L (ref 8–16)
AST SERPL-CCNC: 22 U/L (ref 10–40)
BASOPHILS # BLD AUTO: 0.02 K/UL (ref 0–0.2)
BASOPHILS NFR BLD: 0.4 % (ref 0–1.9)
BILIRUB SERPL-MCNC: 1.2 MG/DL (ref 0.1–1)
BUN SERPL-MCNC: 18 MG/DL (ref 6–20)
CALCIUM SERPL-MCNC: 9.1 MG/DL (ref 8.7–10.5)
CHLORIDE SERPL-SCNC: 108 MMOL/L (ref 95–110)
CO2 SERPL-SCNC: 23 MMOL/L (ref 23–29)
CREAT SERPL-MCNC: 1.2 MG/DL (ref 0.5–1.4)
DIFFERENTIAL METHOD: ABNORMAL
EOSINOPHIL # BLD AUTO: 0 K/UL (ref 0–0.5)
EOSINOPHIL NFR BLD: 0.6 % (ref 0–8)
ERYTHROCYTE [DISTWIDTH] IN BLOOD BY AUTOMATED COUNT: 12.2 % (ref 11.5–14.5)
EST. GFR  (AFRICAN AMERICAN): >60 ML/MIN/1.73 M^2
EST. GFR  (NON AFRICAN AMERICAN): >60 ML/MIN/1.73 M^2
GLUCOSE SERPL-MCNC: 94 MG/DL (ref 70–110)
HCT VFR BLD AUTO: 44.5 % (ref 40–54)
HGB BLD-MCNC: 14.5 G/DL (ref 14–18)
IMM GRANULOCYTES # BLD AUTO: 0.06 K/UL (ref 0–0.04)
IMM GRANULOCYTES NFR BLD AUTO: 1.3 % (ref 0–0.5)
LYMPHOCYTES # BLD AUTO: 2 K/UL (ref 1–4.8)
LYMPHOCYTES NFR BLD: 42.6 % (ref 18–48)
MCH RBC QN AUTO: 34 PG (ref 27–31)
MCHC RBC AUTO-ENTMCNC: 32.6 G/DL (ref 32–36)
MCV RBC AUTO: 105 FL (ref 82–98)
MONOCYTES # BLD AUTO: 0.4 K/UL (ref 0.3–1)
MONOCYTES NFR BLD: 8.2 % (ref 4–15)
NEUTROPHILS # BLD AUTO: 2.2 K/UL (ref 1.8–7.7)
NEUTROPHILS NFR BLD: 46.9 % (ref 38–73)
NRBC BLD-RTO: 0 /100 WBC
PLATELET # BLD AUTO: 189 K/UL (ref 150–350)
PMV BLD AUTO: 10.5 FL (ref 9.2–12.9)
POTASSIUM SERPL-SCNC: 3.9 MMOL/L (ref 3.5–5.1)
PROT SERPL-MCNC: 8.5 G/DL (ref 6–8.4)
RBC # BLD AUTO: 4.26 M/UL (ref 4.6–6.2)
SODIUM SERPL-SCNC: 141 MMOL/L (ref 136–145)
WBC # BLD AUTO: 4.62 K/UL (ref 3.9–12.7)

## 2020-09-01 PROCEDURE — 87536 HIV-1 QUANT&REVRSE TRNSCRPJ: CPT

## 2020-09-01 PROCEDURE — 80053 COMPREHEN METABOLIC PANEL: CPT

## 2020-09-01 PROCEDURE — 86361 T CELL ABSOLUTE COUNT: CPT

## 2020-09-01 PROCEDURE — 85025 COMPLETE CBC W/AUTO DIFF WBC: CPT

## 2020-09-03 LAB
CD3+CD4+ CELLS # BLD: 461 CELLS/UL (ref 300–1400)
CD3+CD4+ CELLS NFR BLD: 23.1 % (ref 28–57)
HIV UQ DATE RECEIVED: NORMAL
HIV UQ DATE REPORTED: NORMAL
HIV1 RNA # SERPL NAA+PROBE: <40 COPIES/ML
HIV1 RNA SERPL NAA+PROBE-LOG#: <1.6 LOG (10) COPIES/ML
HIV1 RNA SERPL QL NAA+PROBE: NOT DETECTED

## 2020-09-08 ENCOUNTER — TELEPHONE (OUTPATIENT)
Dept: PHARMACY | Facility: CLINIC | Age: 40
End: 2020-09-08

## 2020-09-08 NOTE — TELEPHONE ENCOUNTER
Refill call regarding Genvoya at [facility].  Will prepare for shipment with patient consent on  to arrive 9/15.  Copay 0.00.  Patient has 9-10 days on hand.  Patient has not started any new medications including OTC drugs. Patient has not had any medication/ dose or instruction changes. No new allergies or side effects reported with this shipment. Medication is being taken as prescribed by physician and properly stored. Two patient identifiers:  and Address verified. Patient has no questions or concerns for [facility].

## 2020-09-18 DIAGNOSIS — Z01.84 ANTIBODY RESPONSE EXAMINATION: ICD-10-CM

## 2020-09-22 ENCOUNTER — PATIENT MESSAGE (OUTPATIENT)
Dept: INFECTIOUS DISEASES | Facility: CLINIC | Age: 40
End: 2020-09-22

## 2020-10-08 ENCOUNTER — TELEPHONE (OUTPATIENT)
Dept: PHARMACY | Facility: CLINIC | Age: 40
End: 2020-10-08

## 2020-10-18 DIAGNOSIS — Z01.84 ANTIBODY RESPONSE EXAMINATION: ICD-10-CM

## 2020-11-17 DIAGNOSIS — Z01.84 ANTIBODY RESPONSE EXAMINATION: ICD-10-CM

## 2020-11-19 ENCOUNTER — SPECIALTY PHARMACY (OUTPATIENT)
Dept: PHARMACY | Facility: CLINIC | Age: 40
End: 2020-11-19

## 2020-11-19 NOTE — TELEPHONE ENCOUNTER
Specialty Pharmacy - Clinical Reassessment  Specialty Pharmacy - Refill Coordination    Specialty Medication Orders Linked to Encounter      Most Recent Value   Medication #1  elviteg-cob-emtri-tenof ALAFEN (GENVOYA) [phone] mg Tab (Order#[id number], Rx#[id number])        Refill Questions - Documented Responses      Most Recent Value   Relationship to patient of person spoken to?  Self   HIPAA/medical authority confirmed?  Yes   Any changes in contact preferences or allowed representatives?  No   Has the patient had any insurance changes?  No   Has the patient had any changes to specialty medication, dose, or instructions?  No   Has the patient started taking any new medications, herbals, or supplements?  No   Does the patient have any new allergies to medications or foods?  No   Does the patient have any concerns about side effects?  No   Can the patient store medication/sharps container properly (at the correct temperature, away from children/pets, etc.)?  Yes   Can the patient call emergency services (911) in the event of an emergency?  Yes   Does the patient have any concerns or questions about taking or administering this medication as prescribed?  No   How many doses does the patient have on hand?  8   How many days does the patient report on hand quantity will last?  8   Does the number of doses/days supply remaining match pharmacy expected amounts?  Yes   Does the patient feel that this medication is effective?  Yes   During the past 4 weeks, has patient missed any activities due to condition or medication?  No   During the past 4 weeks, did patient have any of the following urgent care visits?  None   How will the patient receive the medication?  Mail   When does the patient need to receive the medication?  11/27/20   Shipping Address  Home   Address in [facility] confirmed and updated if neccessary?  Yes   Expected Copay ($)  0   Is the patient able to afford the medication copay?  Yes    Payment Method  zero copay   Days supply of Refill  30   Would patient like to speak to a pharmacist?  No   Do you want to trigger an intervention?  No   Do you want to trigger an additional referral task?  No   Refill activity completed?  Yes   Refill activity plan  Refill scheduled   Shipment/Pickup Date:  11/23/20        Current Outpatient Medications   Medication Sig    elviteg-cob-emtri-tenof ALAFEN (GENVOYA) [phone] mg Tab Take 1 tablet by mouth once daily.   Last reviewed on 11/19/2020  2:05 PM by Katelynn Dunn PharmD    Review of patient's allergies indicates:  No Known Allergies Last reviewed on  11/19/2020 2:05 PM by Katelynn Dunn    Tasks added this encounter   No tasks added.   Tasks due within next 3 months   11/14/2020 - Refill Call  11/16/2020 - Clinical - Follow Up Assesement (90 day)     Katelynn Dunn PharmD  [facility] - [facility]  [address] [place] [address]  Phone: [phone]  Fax: [phone]

## 2020-11-19 NOTE — TELEPHONE ENCOUNTER
Herson Chavez is a 40 y.o. male, who is followed by the specialty pharmacy service for management and education of his Genvoya.  He has been on therapy with Genvoya for since 2018.  I have reviewed his electronic medical record and current medication list and determined that specialty medication adjustment is not needed at this time.    Patient has not experienced adverse events.  He is adherent reporting 0 missed doses since last review.  Adherence has been encouraged with the following mechanism(s): phone alarms.  He is meeting goals of therapy and will continue treatment.    Follow Up Review 11/19/2020   New Medications? No   New Allergies? No   Medication Effective? Yes   Missed activities? No   Urgent Care? None   Some recent data might be hidden       Therapy is appropriate to continue.    Therapy is effective: Yes  Recommendations: none at this time.  Review Method: Patient Contact and chart review    Pt reports they are not having any side effects so far. No missed doses, no new medications, no new allergies or health conditions reported at this time. Allergies reviewed and medication reconciliation complete (reviewed and documented in [facility]).  No DDI with Genvoya.  Aware to avoid all OTC products without checking with [facility]. Disease education reviewed (including transmission and prevention). Patient counseled on importance of maintaining adherence and keeping lab appointments which were scheduled. All questions answered and addressed to patients satisfaction. Advised to call [facility] and provider if any issues arise.  Pt verbalized understanding.    Katelynn Dunn, PharmD  [facility]  [address] [facility] [address]  Phone: [phone]  Fax: [phone]

## 2020-12-17 ENCOUNTER — CLINICAL SUPPORT (OUTPATIENT)
Dept: URGENT CARE | Facility: CLINIC | Age: 40
End: 2020-12-17
Payer: COMMERCIAL

## 2020-12-17 ENCOUNTER — TELEPHONE (OUTPATIENT)
Dept: PRIMARY CARE CLINIC | Facility: OTHER | Age: 40
End: 2020-12-17

## 2020-12-17 DIAGNOSIS — R11.0 NAUSEA: ICD-10-CM

## 2020-12-17 DIAGNOSIS — R05.9 COUGH: ICD-10-CM

## 2020-12-17 DIAGNOSIS — R50.9 FEVER, UNSPECIFIED FEVER CAUSE: Primary | ICD-10-CM

## 2020-12-17 DIAGNOSIS — R50.9 FEVER, UNSPECIFIED FEVER CAUSE: ICD-10-CM

## 2020-12-17 DIAGNOSIS — J02.9 SORE THROAT: ICD-10-CM

## 2020-12-17 LAB
CTP QC/QA: YES
SARS-COV-2 RDRP RESP QL NAA+PROBE: NEGATIVE

## 2020-12-17 PROCEDURE — U0002 COVID-19 LAB TEST NON-CDC: HCPCS | Mod: QW,S$GLB,, | Performed by: INTERNAL MEDICINE

## 2020-12-17 PROCEDURE — U0002: ICD-10-PCS | Mod: QW,S$GLB,, | Performed by: INTERNAL MEDICINE

## 2020-12-22 ENCOUNTER — IMMUNIZATION (OUTPATIENT)
Dept: INTERNAL MEDICINE | Facility: CLINIC | Age: 40
End: 2020-12-22
Payer: COMMERCIAL

## 2020-12-22 DIAGNOSIS — Z23 NEED FOR VACCINATION: ICD-10-CM

## 2020-12-22 PROCEDURE — 91300 COVID-19, MRNA, LNP-S, PF, 30 MCG/0.3 ML DOSE VACCINE: ICD-10-PCS | Mod: ,,, | Performed by: INTERNAL MEDICINE

## 2020-12-22 PROCEDURE — 0001A COVID-19, MRNA, LNP-S, PF, 30 MCG/0.3 ML DOSE VACCINE: ICD-10-PCS | Mod: CV19,,, | Performed by: INTERNAL MEDICINE

## 2020-12-22 PROCEDURE — 0001A COVID-19, MRNA, LNP-S, PF, 30 MCG/0.3 ML DOSE VACCINE: CPT | Mod: CV19,,, | Performed by: INTERNAL MEDICINE

## 2020-12-22 PROCEDURE — 91300 COVID-19, MRNA, LNP-S, PF, 30 MCG/0.3 ML DOSE VACCINE: CPT | Mod: ,,, | Performed by: INTERNAL MEDICINE

## 2020-12-29 ENCOUNTER — SPECIALTY PHARMACY (OUTPATIENT)
Dept: PHARMACY | Facility: CLINIC | Age: 40
End: 2020-12-29

## 2020-12-29 NOTE — TELEPHONE ENCOUNTER
Specialty Pharmacy - Refill Coordination    Specialty Medication Orders Linked to Encounter      Most Recent Value   Medication #1  elviteg-cob-emtri-tenof ALAFEN (GENVOYA) [phone] mg Tab (Order#[id number], Rx#[id number])          Refill Questions - Documented Responses      Most Recent Value   Relationship to patient of person spoken to?  Self   HIPAA/medical authority confirmed?  Yes   Any changes in contact preferences or allowed representatives?  No   Has the patient had any insurance changes?  No   Has the patient had any changes to specialty medication, dose, or instructions?  No   Has the patient started taking any new medications, herbals, or supplements?  No   Has the patient been diagnosed with any new medical conditions?  No   Does the patient have any new allergies to medications or foods?  No   Does the patient have any concerns about side effects?  No   Can the patient store medication/sharps container properly (at the correct temperature, away from children/pets, etc.)?  Yes   Can the patient call emergency services (911) in the event of an emergency?  Yes   Does the patient have any concerns or questions about taking or administering this medication as prescribed?  No   How many doses did the patient miss in the past 4 weeks or since the last fill?  0   How many doses does the patient have on hand?  10   How many days does the patient report on hand quantity will last?  10   Does the number of doses/days supply remaining match pharmacy expected amounts?  Yes   Does the patient feel that this medication is effective?  Yes   During the past 4 weeks, has patient missed any activities due to condition or medication?  No   During the past 4 weeks, did patient have any of the following urgent care visits?  None   How will the patient receive the medication?  Mail   When does the patient need to receive the medication?  01/08/21   Shipping Address  Home   Address in [facility] confirmed and  updated if neccessary?  Yes   Expected Copay ($)  0   Is the patient able to afford the medication copay?  Yes   Payment Method  zero copay   Days supply of Refill  30   Would patient like to speak to a pharmacist?  No   Do you want to trigger an intervention?  No   Do you want to trigger an additional referral task?  No   Refill activity completed?  Yes   Refill activity plan  Refill scheduled   Shipment/Pickup Date:  01/04/21          Current Outpatient Medications   Medication Sig    elviteg-cob-emtri-tenof ALAFEN (GENVOYA) [phone] mg Tab Take 1 tablet by mouth once daily.   Last reviewed on 11/19/2020  2:05 PM by Katelynn Dunn, PharmD    Review of patient's allergies indicates:  No Known Allergies Last reviewed on  11/19/2020 2:05 PM by Katelynn Dunn      Tasks added this encounter   1/31/2021 - Refill Call (Auto Added)   Tasks due within next 3 months   2/10/2021 - Clinical - Follow Up Assesement (90 day)     LANDRY DUNHAM  [facility] - Specialty Pharm[facility]  [address] [place] [address]  Phone: [phone]  Fax: [phone]

## 2021-01-11 ENCOUNTER — TELEPHONE (OUTPATIENT)
Dept: PRIMARY CARE CLINIC | Facility: OTHER | Age: 41
End: 2021-01-11

## 2021-01-11 ENCOUNTER — CLINICAL SUPPORT (OUTPATIENT)
Dept: URGENT CARE | Facility: CLINIC | Age: 41
End: 2021-01-11
Payer: COMMERCIAL

## 2021-01-11 DIAGNOSIS — R43.0 ANOSMIA: Primary | ICD-10-CM

## 2021-01-11 DIAGNOSIS — R43.0 ANOSMIA: ICD-10-CM

## 2021-01-11 LAB
CTP QC/QA: YES
SARS-COV-2 RDRP RESP QL NAA+PROBE: NEGATIVE

## 2021-01-11 PROCEDURE — U0002: ICD-10-PCS | Mod: QW,S$GLB,, | Performed by: INTERNAL MEDICINE

## 2021-01-11 PROCEDURE — U0002 COVID-19 LAB TEST NON-CDC: HCPCS | Mod: QW,S$GLB,, | Performed by: INTERNAL MEDICINE

## 2021-01-12 ENCOUNTER — IMMUNIZATION (OUTPATIENT)
Dept: INTERNAL MEDICINE | Facility: CLINIC | Age: 41
End: 2021-01-12
Payer: COMMERCIAL

## 2021-01-12 DIAGNOSIS — Z23 NEED FOR VACCINATION: ICD-10-CM

## 2021-01-12 PROCEDURE — 91300 COVID-19, MRNA, LNP-S, PF, 30 MCG/0.3 ML DOSE VACCINE: CPT | Mod: ,,, | Performed by: INTERNAL MEDICINE

## 2021-01-12 PROCEDURE — 0002A COVID-19, MRNA, LNP-S, PF, 30 MCG/0.3 ML DOSE VACCINE: ICD-10-PCS | Mod: CV19,,, | Performed by: INTERNAL MEDICINE

## 2021-01-12 PROCEDURE — 91300 COVID-19, MRNA, LNP-S, PF, 30 MCG/0.3 ML DOSE VACCINE: ICD-10-PCS | Mod: ,,, | Performed by: INTERNAL MEDICINE

## 2021-01-12 PROCEDURE — 0002A COVID-19, MRNA, LNP-S, PF, 30 MCG/0.3 ML DOSE VACCINE: CPT | Mod: CV19,,, | Performed by: INTERNAL MEDICINE

## 2021-02-19 ENCOUNTER — SPECIALTY PHARMACY (OUTPATIENT)
Dept: PHARMACY | Facility: CLINIC | Age: 41
End: 2021-02-19

## 2021-03-19 DIAGNOSIS — Z21 HIV POSITIVE: ICD-10-CM

## 2021-03-21 RX ORDER — ELVITEGRAVIR, COBICISTAT, EMTRICITABINE, AND TENOFOVIR ALAFENAMIDE 150; 150; 200; 10 MG/1; MG/1; MG/1; MG/1
1 TABLET ORAL DAILY
Qty: 30 TABLET | Refills: 6 | Status: SHIPPED | OUTPATIENT
Start: 2021-03-21 | End: 2021-12-02 | Stop reason: SDUPTHER

## 2021-03-25 ENCOUNTER — SPECIALTY PHARMACY (OUTPATIENT)
Dept: PHARMACY | Facility: CLINIC | Age: 41
End: 2021-03-25

## 2021-04-26 ENCOUNTER — PATIENT MESSAGE (OUTPATIENT)
Dept: PHARMACY | Facility: CLINIC | Age: 41
End: 2021-04-26

## 2021-05-07 ENCOUNTER — SPECIALTY PHARMACY (OUTPATIENT)
Dept: PHARMACY | Facility: CLINIC | Age: 41
End: 2021-05-07

## 2021-06-02 ENCOUNTER — PATIENT MESSAGE (OUTPATIENT)
Dept: PHARMACY | Facility: CLINIC | Age: 41
End: 2021-06-02

## 2021-06-08 ENCOUNTER — SPECIALTY PHARMACY (OUTPATIENT)
Dept: PHARMACY | Facility: CLINIC | Age: 41
End: 2021-06-08

## 2021-07-12 ENCOUNTER — PATIENT MESSAGE (OUTPATIENT)
Dept: PHARMACY | Facility: CLINIC | Age: 41
End: 2021-07-12

## 2021-07-16 ENCOUNTER — SPECIALTY PHARMACY (OUTPATIENT)
Dept: PHARMACY | Facility: CLINIC | Age: 41
End: 2021-07-16

## 2021-08-10 ENCOUNTER — LAB VISIT (OUTPATIENT)
Dept: INTERNAL MEDICINE | Facility: CLINIC | Age: 41
End: 2021-08-10

## 2021-08-10 DIAGNOSIS — R43.2 LOSS OF TASTE: ICD-10-CM

## 2021-08-10 DIAGNOSIS — R51.9 COMPLAINT OF HEADACHE: ICD-10-CM

## 2021-08-10 DIAGNOSIS — R43.0 LOSS OF SMELL: ICD-10-CM

## 2021-08-10 DIAGNOSIS — R11.0 NAUSEA: Primary | ICD-10-CM

## 2021-08-10 DIAGNOSIS — R11.0 NAUSEA: ICD-10-CM

## 2021-08-10 DIAGNOSIS — J02.9 SORE THROAT: ICD-10-CM

## 2021-08-10 DIAGNOSIS — R11.10 VOMITING, INTRACTABILITY OF VOMITING NOT SPECIFIED, PRESENCE OF NAUSEA NOT SPECIFIED, UNSPECIFIED VOMITING TYPE: ICD-10-CM

## 2021-08-10 LAB — SARS-COV-2 RDRP RESP QL NAA+PROBE: NEGATIVE

## 2021-08-10 PROCEDURE — U0002 COVID-19 LAB TEST NON-CDC: HCPCS | Performed by: PREVENTIVE MEDICINE

## 2021-08-18 ENCOUNTER — PATIENT MESSAGE (OUTPATIENT)
Dept: PHARMACY | Facility: CLINIC | Age: 41
End: 2021-08-18

## 2021-08-24 ENCOUNTER — SPECIALTY PHARMACY (OUTPATIENT)
Dept: PHARMACY | Facility: CLINIC | Age: 41
End: 2021-08-24

## 2021-09-27 ENCOUNTER — PATIENT MESSAGE (OUTPATIENT)
Dept: PHARMACY | Facility: CLINIC | Age: 41
End: 2021-09-27

## 2021-10-01 ENCOUNTER — SPECIALTY PHARMACY (OUTPATIENT)
Dept: PHARMACY | Facility: CLINIC | Age: 41
End: 2021-10-01

## 2021-11-03 ENCOUNTER — PATIENT MESSAGE (OUTPATIENT)
Dept: PHARMACY | Facility: CLINIC | Age: 41
End: 2021-11-03
Payer: COMMERCIAL

## 2021-11-05 ENCOUNTER — SPECIALTY PHARMACY (OUTPATIENT)
Dept: PHARMACY | Facility: CLINIC | Age: 41
End: 2021-11-05
Payer: COMMERCIAL

## 2021-12-02 DIAGNOSIS — Z21 HIV POSITIVE: ICD-10-CM

## 2021-12-03 RX ORDER — ELVITEGRAVIR, COBICISTAT, EMTRICITABINE, AND TENOFOVIR ALAFENAMIDE 150; 150; 200; 10 MG/1; MG/1; MG/1; MG/1
1 TABLET ORAL DAILY
Qty: 30 TABLET | Refills: 6 | Status: SHIPPED | OUTPATIENT
Start: 2021-12-03

## 2021-12-06 ENCOUNTER — SPECIALTY PHARMACY (OUTPATIENT)
Dept: PHARMACY | Facility: CLINIC | Age: 41
End: 2021-12-06
Payer: COMMERCIAL

## 2021-12-09 ENCOUNTER — PATIENT MESSAGE (OUTPATIENT)
Dept: FAMILY MEDICINE | Facility: CLINIC | Age: 41
End: 2021-12-09
Payer: COMMERCIAL

## 2021-12-18 ENCOUNTER — NURSE TRIAGE (OUTPATIENT)
Dept: ADMINISTRATIVE | Facility: CLINIC | Age: 41
End: 2021-12-18
Payer: COMMERCIAL

## 2021-12-18 ENCOUNTER — CLINICAL SUPPORT (OUTPATIENT)
Dept: URGENT CARE | Facility: CLINIC | Age: 41
End: 2021-12-18
Payer: COMMERCIAL

## 2021-12-18 ENCOUNTER — TELEPHONE (OUTPATIENT)
Dept: URGENT CARE | Facility: CLINIC | Age: 41
End: 2021-12-18

## 2021-12-18 DIAGNOSIS — Z11.9 ENCOUNTER FOR SCREENING EXAMINATION FOR INFECTIOUS DISEASE: Primary | ICD-10-CM

## 2021-12-18 DIAGNOSIS — U07.1 COVID-19 VIRUS DETECTED: ICD-10-CM

## 2021-12-18 DIAGNOSIS — U07.1 COVID-19 VIRUS DETECTED: Primary | ICD-10-CM

## 2021-12-18 LAB
CTP QC/QA: YES
SARS-COV-2 RDRP RESP QL NAA+PROBE: POSITIVE

## 2021-12-18 PROCEDURE — U0002 COVID-19 LAB TEST NON-CDC: HCPCS | Mod: QW,S$GLB,, | Performed by: FAMILY MEDICINE

## 2021-12-18 PROCEDURE — U0002: ICD-10-PCS | Mod: QW,S$GLB,, | Performed by: FAMILY MEDICINE

## 2021-12-20 ENCOUNTER — INFUSION (OUTPATIENT)
Dept: INFECTIOUS DISEASES | Facility: HOSPITAL | Age: 41
End: 2021-12-20
Attending: FAMILY MEDICINE
Payer: COMMERCIAL

## 2021-12-20 VITALS
WEIGHT: 200 LBS | SYSTOLIC BLOOD PRESSURE: 115 MMHG | OXYGEN SATURATION: 95 % | RESPIRATION RATE: 16 BRPM | HEART RATE: 77 BPM | BODY MASS INDEX: 29.62 KG/M2 | TEMPERATURE: 98 F | HEIGHT: 69 IN | DIASTOLIC BLOOD PRESSURE: 68 MMHG

## 2021-12-20 DIAGNOSIS — U07.1 COVID-19: Primary | ICD-10-CM

## 2021-12-20 PROCEDURE — M0243 CASIRIVI AND IMDEVI INFUSION: HCPCS | Performed by: INTERNAL MEDICINE

## 2021-12-20 PROCEDURE — 25000003 PHARM REV CODE 250: Performed by: INTERNAL MEDICINE

## 2021-12-20 PROCEDURE — 63600175 PHARM REV CODE 636 W HCPCS: Performed by: INTERNAL MEDICINE

## 2021-12-20 RX ORDER — DIPHENHYDRAMINE HYDROCHLORIDE 50 MG/ML
25 INJECTION INTRAMUSCULAR; INTRAVENOUS ONCE AS NEEDED
Status: ACTIVE | OUTPATIENT
Start: 2021-12-20 | End: 2033-05-18

## 2021-12-20 RX ORDER — ALBUTEROL SULFATE 90 UG/1
2 AEROSOL, METERED RESPIRATORY (INHALATION)
Status: ACTIVE | OUTPATIENT
Start: 2021-12-20

## 2021-12-20 RX ORDER — ONDANSETRON 4 MG/1
4 TABLET, ORALLY DISINTEGRATING ORAL ONCE AS NEEDED
Status: ACTIVE | OUTPATIENT
Start: 2021-12-20 | End: 2033-05-18

## 2021-12-20 RX ORDER — EPINEPHRINE 0.3 MG/.3ML
0.3 INJECTION SUBCUTANEOUS
Status: ACTIVE | OUTPATIENT
Start: 2021-12-20

## 2021-12-20 RX ORDER — SODIUM CHLORIDE 0.9 % (FLUSH) 0.9 %
10 SYRINGE (ML) INJECTION
Status: ACTIVE | OUTPATIENT
Start: 2021-12-20

## 2021-12-20 RX ORDER — ACETAMINOPHEN 325 MG/1
650 TABLET ORAL ONCE AS NEEDED
Status: ACTIVE | OUTPATIENT
Start: 2021-12-20 | End: 2033-05-18

## 2021-12-20 RX ADMIN — CASIRIVIMAB AND IMDEVIMAB 600 MG: 600; 600 INJECTION, SOLUTION, CONCENTRATE INTRAVENOUS at 07:12

## 2022-01-07 ENCOUNTER — PATIENT MESSAGE (OUTPATIENT)
Dept: PHARMACY | Facility: CLINIC | Age: 42
End: 2022-01-07
Payer: COMMERCIAL

## 2022-01-18 ENCOUNTER — SPECIALTY PHARMACY (OUTPATIENT)
Dept: PHARMACY | Facility: CLINIC | Age: 42
End: 2022-01-18
Payer: COMMERCIAL

## 2022-01-18 NOTE — TELEPHONE ENCOUNTER
Specialty Pharmacy - Refill Coordination    Specialty Medication Orders Linked to Encounter    Flowsheet Row Most Recent Value   Medication #1 elviteg-cob-emtri-tenof ALAFEN (GENVOYA) [phone] mg Tab (Order#[id number], Rx#[id number])          Refill Questions - Documented Responses    Flowsheet Row Most Recent Value   Patient Availability and HIPAA Verification    Does patient want to proceed with activity? Yes   HIPAA/medical authority confirmed? Yes   Relationship to patient of person spoken to? Self   Refill Screening Questions    Changes to allergies? No   Changes to medications? No   New conditions since last clinic visit? No   Unplanned office visit, urgent care, ED, or hospital admission in the last 4 weeks? No   How does patient/caregiver feel medication is working? Excellent   Financial problems or insurance changes? No   How many doses of your specialty medications were missed in the last 4 weeks? 0   Would patient like to speak to a pharmacist? No   When does the patient need to receive the medication? 01/27/22   Refill Delivery Questions    How will the patient receive the medication? Delivery Alivia   When does the patient need to receive the medication? 01/27/22   Shipping Address Home   Address in [facility] confirmed and updated if neccessary? Yes   Expected Copay ($) 0   Is the patient able to afford the medication copay? Yes   Payment Method zero copay   Days supply of Refill 30   Supplies needed? No supplies needed   Refill activity completed? Yes   Refill activity plan Refill scheduled   Shipment/Pickup Date: 01/25/22          Current Outpatient Medications   Medication Sig    doxycycline (VIBRA-TABS) 100 MG tablet Take 1 tablet (100 mg total) by mouth once daily.    elviteg-cob-emtri-tenof ALAFEN (GENVOYA) [phone] mg Tab Take 1 tablet by mouth once daily.   Last reviewed on 10/1/2021 10:51 AM by Alfred Sanders    Review of patient's allergies indicates:  No Known  Allergies Last reviewed on  12/20/2021 7:22 AM by Ailyn Sanon      Tasks added this encounter   2/19/2022 - Refill Call (Auto Added)   Tasks due within next 3 months   No tasks due.     Katelynn Dunn, PharmD  [facility]  [address]  [facility] [address]  Phone: [phone]  Fax: [phone]

## 2022-02-19 ENCOUNTER — SPECIALTY PHARMACY (OUTPATIENT)
Dept: PHARMACY | Facility: CLINIC | Age: 42
End: 2022-02-19
Payer: COMMERCIAL

## 2022-02-19 NOTE — TELEPHONE ENCOUNTER
Specialty Pharmacy - Refill Coordination    Specialty Medication Orders Linked to Encounter    Flowsheet Row Most Recent Value   Medication #1 elviteg-cob-emtri-tenof ALAFEN (GENVOYA) [id number] mg Tab (Order#[id number], Rx#[id number])          Refill Questions - Documented Responses    Flowsheet Row Most Recent Value   Patient Availability and HIPAA Verification    Does patient want to proceed with activity? Yes   HIPAA/medical authority confirmed? Yes   Relationship to patient of person spoken to? Self   Refill Screening Questions    Changes to allergies? No   Changes to medications? No   New conditions since last clinic visit? No   Unplanned office visit, urgent care, ED, or hospital admission in the last 4 weeks? No   How does patient/caregiver feel medication is working? Good   Financial problems or insurance changes? No   How many doses of your specialty medications were missed in the last 4 weeks? 0   Would patient like to speak to a pharmacist? No   When does the patient need to receive the medication? 02/22/22   Refill Delivery Questions    How will the patient receive the medication? Delivery Alivia   When does the patient need to receive the medication? 02/22/22   Shipping Address Home   Address in [facility] confirmed and updated if neccessary? Yes   Expected Copay ($) 0   Is the patient able to afford the medication copay? Yes   Payment Method zero copay   Days supply of Refill 30   Supplies needed? No supplies needed   Refill activity completed? Yes   Refill activity plan Refill scheduled   Shipment/Pickup Date: 02/22/22          Current Outpatient Medications   Medication Sig    doxycycline (VIBRA-TABS) 100 MG tablet Take 1 tablet (100 mg total) by mouth once daily.    elviteg-cob-emtri-tenof ALAFEN (GENVOYA) [phone] mg Tab Take 1 tablet by mouth once daily.   Last reviewed on 10/1/2021 10:51 AM by Alfred Sanders    Review of patient's allergies indicates:  No Known Allergies  Last reviewed on  12/20/2021 7:22 AM by Ailyn Sanon      Tasks added this encounter   3/22/2022 - Refill Call (Auto Added)   Tasks due within next 3 months   No tasks due.     [facility] [address] [facility]  [address]  [facility] [address]  Phone: [phone]  Fax: [phone]

## 2022-03-22 ENCOUNTER — SPECIALTY PHARMACY (OUTPATIENT)
Dept: PHARMACY | Facility: CLINIC | Age: 42
End: 2022-03-22
Payer: COMMERCIAL

## 2022-03-22 ENCOUNTER — PATIENT MESSAGE (OUTPATIENT)
Dept: PHARMACY | Facility: CLINIC | Age: 42
End: 2022-03-22
Payer: COMMERCIAL

## 2022-03-25 ENCOUNTER — PATIENT MESSAGE (OUTPATIENT)
Dept: PHARMACY | Facility: CLINIC | Age: 42
End: 2022-03-25
Payer: COMMERCIAL

## 2022-04-04 ENCOUNTER — SPECIALTY PHARMACY (OUTPATIENT)
Dept: PHARMACY | Facility: CLINIC | Age: 42
End: 2022-04-04
Payer: COMMERCIAL

## 2022-04-04 NOTE — TELEPHONE ENCOUNTER
Specialty Pharmacy - Refill Coordination    Specialty Medication Orders Linked to Encounter    Flowsheet Row Most Recent Value   Medication #1 elviteg-cob-emtri-tenof ALAFEN (GENVOYA) [id number] mg Tab (Order#[id number], Rx#[id number])          Refill Questions - Documented Responses    Flowsheet Row Most Recent Value   Patient Availability and HIPAA Verification    Does patient want to proceed with activity? Yes   HIPAA/medical authority confirmed? Yes   Relationship to patient of person spoken to? Self   Refill Screening Questions    Changes to allergies? No   Changes to medications? No   New conditions since last clinic visit? No   Unplanned office visit, urgent care, ED, or hospital admission in the last 4 weeks? No   How does patient/caregiver feel medication is working? Good   Financial problems or insurance changes? No   How many doses of your specialty medications were missed in the last 4 weeks? 0   Would patient like to speak to a pharmacist? No   When does the patient need to receive the medication? 04/15/22   Refill Delivery Questions    How will the patient receive the medication? Delivery Alivia   When does the patient need to receive the medication? 04/15/22   Shipping Address Home   Address in [facility] confirmed and updated if neccessary? Yes   Expected Copay ($) 0   Is the patient able to afford the medication copay? Yes   Payment Method zero copay   Days supply of Refill 30   Supplies needed? No supplies needed   Refill activity completed? Yes   Refill activity plan Refill scheduled   Shipment/Pickup Date: 04/11/22          Current Outpatient Medications   Medication Sig    doxycycline (VIBRA-TABS) 100 MG tablet Take 1 tablet (100 mg total) by mouth once daily.    elviteg-cob-emtri-tenof ALAFEN (GENVOYA) [id number] mg Tab Take 1 tablet by mouth once daily.   Last reviewed on 10/1/2021 10:51 AM by Alfred Sanders    Review of patient's allergies indicates:  No Known Allergies  Last reviewed on  12/20/2021 7:22 AM by Ailyn Sanon      Tasks added this encounter   No tasks added.   Tasks due within next 3 months   5/8/2022 - Refill Call (Auto Added)     aG Arnold, PharmD  [facility]  [address]  [facility] [address]  Phone: [phone]  Fax: [phone]

## 2022-04-18 ENCOUNTER — PATIENT MESSAGE (OUTPATIENT)
Dept: ADMINISTRATIVE | Facility: OTHER | Age: 42
End: 2022-04-18
Payer: COMMERCIAL

## 2022-05-09 ENCOUNTER — PATIENT MESSAGE (OUTPATIENT)
Dept: PHARMACY | Facility: CLINIC | Age: 42
End: 2022-05-09
Payer: COMMERCIAL

## 2022-05-09 ENCOUNTER — SPECIALTY PHARMACY (OUTPATIENT)
Dept: PHARMACY | Facility: CLINIC | Age: 42
End: 2022-05-09
Payer: COMMERCIAL

## 2022-05-09 NOTE — TELEPHONE ENCOUNTER
Specialty Pharmacy - Refill Coordination    Specialty Medication Orders Linked to Encounter    Flowsheet Row Most Recent Value   Medication #1 elviteg-cob-emtri-tenof ALAFEN (GENVOYA) [id number] mg Tab (Order#[id number], Rx#[id number])          Refill Questions - Documented Responses    Flowsheet Row Most Recent Value   Patient Availability and HIPAA Verification    Does patient want to proceed with activity? Yes   HIPAA/medical authority confirmed? Yes   Relationship to patient of person spoken to? Self   Refill Screening Questions    Changes to allergies? No   Changes to medications? No   New conditions since last clinic visit? No   Unplanned office visit, urgent care, ED, or hospital admission in the last 4 weeks? No   How does patient/caregiver feel medication is working? Good   Financial problems or insurance changes? No   How many doses of your specialty medications were missed in the last 4 weeks? 0   Would patient like to speak to a pharmacist? No   When does the patient need to receive the medication? 05/16/22   Refill Delivery Questions    How will the patient receive the medication? Delivery Alivia   When does the patient need to receive the medication? 05/16/22   Shipping Address Home   Address in [facility] confirmed and updated if neccessary? Yes   Expected Copay ($) 0   Is the patient able to afford the medication copay? Yes   Payment Method zero copay   Days supply of Refill 30   Supplies needed? No supplies needed   Refill activity completed? Yes   Refill activity plan Refill scheduled   Shipment/Pickup Date: 05/13/22          Current Outpatient Medications   Medication Sig    doxycycline (VIBRA-TABS) 100 MG tablet Take 1 tablet (100 mg total) by mouth once daily.    elviteg-cob-emtri-tenof ALAFEN (GENVOYA) [id number] mg Tab Take 1 tablet by mouth once daily.   Last reviewed on 10/1/2021 10:51 AM by Alfred Sanders    Review of patient's allergies indicates:  No Known Allergies  Last reviewed on  12/20/2021 7:22 AM by Ailyn Sanon      Tasks added this encounter   No tasks added.   Tasks due within next 3 months   5/8/2022 - Refill Call (Auto Added)     CYNTHIA FELDMAN, PharmD  [facility]  [address]  [facility] [address]  Phone: [phone]  Fax: [phone]

## 2022-06-03 ENCOUNTER — SPECIALTY PHARMACY (OUTPATIENT)
Dept: PHARMACY | Facility: CLINIC | Age: 42
End: 2022-06-03
Payer: COMMERCIAL

## 2022-06-03 DIAGNOSIS — B20 AIDS (ACQUIRED IMMUNODEFICIENCY SYNDROME), CD4 <=200: Primary | ICD-10-CM

## 2022-06-03 NOTE — TELEPHONE ENCOUNTER
Specialty Pharmacy - Clinical Reassessment    Specialty Medication Orders Linked to Encounter    Flowsheet Row Most Recent Value   Medication #1 elviteg-cob-emtri-tenof ALAFEN (GENVOYA) [id number] mg Tab (Order#[id number], Rx#[id number])        Patient Diagnosis   B20 - AIDS (acquired immunodeficiency syndrome), CD4 <=200    Specialty clinical pharmacist review completed for an annual review of reassessment. Reviewed the following areas: current med list, reports of adverse effects, adherence and progress towards therapeutic goals.    Recommendations: none at this time.    Tasks added this encounter   3/3/2023 - Clinical - Follow Up Assesement (Annual)   Tasks due within next 3 months   6/8/2022 - Refill Call (Auto Added)     Krystyna Ornelas, PharmD  [facility]  [address] [place], [place] [address]  [place] [address]  Phone: [phone]  Fax: [phone]

## 2022-06-08 ENCOUNTER — SPECIALTY PHARMACY (OUTPATIENT)
Dept: PHARMACY | Facility: CLINIC | Age: 42
End: 2022-06-08
Payer: COMMERCIAL

## 2022-06-08 NOTE — TELEPHONE ENCOUNTER
Specialty Pharmacy - Refill Coordination    Specialty Medication Orders Linked to Encounter    Flowsheet Row Most Recent Value   Medication #1 elviteg-cob-emtri-tenof ALAFEN (GENVOYA) [phone] mg Tab (Order#[id number], Rx#[id number])          Refill Questions - Documented Responses    Flowsheet Row Most Recent Value   Patient Availability and HIPAA Verification    Does patient want to proceed with activity? Yes   HIPAA/medical authority confirmed? Yes   Relationship to patient of person spoken to? Self   Refill Screening Questions    Changes to allergies? No   Changes to medications? No   New conditions since last clinic visit? No   Unplanned office visit, urgent care, ED, or hospital admission in the last 4 weeks? No   How does patient/caregiver feel medication is working? Good   Financial problems or insurance changes? No   How many doses of your specialty medications were missed in the last 4 weeks? 0   Would patient like to speak to a pharmacist? No   When does the patient need to receive the medication? 06/16/22   Refill Delivery Questions    How will the patient receive the medication? Delivery Alivia   When does the patient need to receive the medication? 06/16/22   Shipping Address Home   Address in [facility] confirmed and updated if neccessary? Yes   Expected Copay ($) 0   Is the patient able to afford the medication copay? Yes   Payment Method zero copay   Days supply of Refill 30   Supplies needed? No supplies needed   Refill activity completed? Yes   Refill activity plan Refill scheduled   Shipment/Pickup Date: 06/16/22          Current Outpatient Medications   Medication Sig    doxycycline (VIBRA-TABS) 100 MG tablet Take 1 tablet (100 mg total) by mouth once daily.    elviteg-cob-emtri-tenof ALAFEN (GENVOYA) [phone] mg Tab Take 1 tablet by mouth once daily.   Last reviewed on 10/1/2021 10:51 AM by Alfred Sanders    Review of patient's allergies indicates:  No Known Allergies  Last reviewed on  12/20/2021 7:22 AM by Ailyn Sanon      Tasks added this encounter   7/9/2022 - Refill Call (Auto Added)   Tasks due within next 3 months   No tasks due.     [facility]  [address] [place], [place] [address]  [facility] [address]  Phone: [phone]  Fax: [phone]

## 2022-07-11 ENCOUNTER — PATIENT MESSAGE (OUTPATIENT)
Dept: PHARMACY | Facility: CLINIC | Age: 42
End: 2022-07-11
Payer: COMMERCIAL

## 2022-07-14 ENCOUNTER — SPECIALTY PHARMACY (OUTPATIENT)
Dept: PHARMACY | Facility: CLINIC | Age: 42
End: 2022-07-14
Payer: COMMERCIAL

## 2022-07-20 NOTE — TELEPHONE ENCOUNTER
Specialty Pharmacy - Refill Coordination    Specialty Medication Orders Linked to Encounter    Flowsheet Row Most Recent Value   Medication #1 elviteg-cob-emtri-tenof ALAFEN (GENVOYA) [phone] mg Tab (Order#[id number], Rx#[id number])        Refill Questions - Documented Responses    Flowsheet Row Most Recent Value   Patient Availability and HIPAA Verification    Does patient want to proceed with activity? Unable to Reach   HIPAA/medical authority confirmed? No        Attempted to contact patient for Genvoya refill.  Attempted to call on 7/11, 7/14, and 7/20.  [facility] message sent on 7/11.  Last Dispensed on 6/15/22 for 30 DS.      We have had multiple attempts to the patient and have been unsuccessful to reach the patient. We will stop reaching out to the patient but in the event that the patient needs the med and contacts us, we will communicate and begin dispensing for the patient. At your next visit with the patient, please review the importance of being in contact with our specialty pharmacy as a part of our care team.    Interventions added this encounter   Closed: [facility] Provider Intervention - Drug therapy adherence: elviteg-cob-emtri-tenof ALAFEN (GENVOYA) [phone] mg Tab     Tommy Laura, PharmD  [facility]  [address] [facility] [address]  Phone: [phone]  Fax: [phone]

## 2022-07-20 NOTE — TELEPHONE ENCOUNTER
Patient returned [facility]'s call regarding Genvoya. Patient reports 13 tablets on hand. Patient has switched insurances. Unable to pull information via eligibility check, and patient does not have new insurance information on hand.     Recommended obtaining new insurance information and reaching out to [facility]. Routing assigned [facility] for follow up.

## 2022-07-28 ENCOUNTER — TELEPHONE (OUTPATIENT)
Dept: PHARMACY | Facility: CLINIC | Age: 42
End: 2022-07-28
Payer: COMMERCIAL

## 2022-08-01 NOTE — TELEPHONE ENCOUNTER
Attempted to contact patient for Genvoya refill and new insurance information.  No answer and LVM.

## 2022-08-04 ENCOUNTER — SPECIALTY PHARMACY (OUTPATIENT)
Dept: PHARMACY | Facility: CLINIC | Age: 42
End: 2022-08-04
Payer: COMMERCIAL

## 2022-08-04 NOTE — TELEPHONE ENCOUNTER
Specialty Pharmacy - Refill Coordination    Specialty Medication Orders Linked to Encounter    Flowsheet Row Most Recent Value   Medication #1 elviteg-cob-emtri-tenof ALAFEN (GENVOYA) [phone] mg Tab (Order#[id number], Rx#[id number])        Refill Questions - Documented Responses    Flowsheet Row Most Recent Value   Patient Availability and HIPAA Verification    Does patient want to proceed with activity? Unable to Reach        We have had multiple attempts to the patient and have been unsuccessful to reach the patient. We will stop reaching out to the patient but in the event that the patient needs the med and contacts us, we will communicate and begin dispensing for the patient. At your next visit with the patient, please review the importance of being in contact with our specialty pharmacy as a part of our care team.      [facility]  [address] [facility] [address]  Phone: [phone]  Fax: [phone]